# Patient Record
Sex: FEMALE | Race: WHITE | NOT HISPANIC OR LATINO | Employment: OTHER | URBAN - METROPOLITAN AREA
[De-identification: names, ages, dates, MRNs, and addresses within clinical notes are randomized per-mention and may not be internally consistent; named-entity substitution may affect disease eponyms.]

---

## 2023-05-08 ENCOUNTER — HOSPITAL ENCOUNTER (OUTPATIENT)
Facility: CLINIC | Age: 78
Setting detail: OBSERVATION
Discharge: HOME | End: 2023-05-09
Attending: EMERGENCY MEDICINE | Admitting: INTERNAL MEDICINE
Payer: COMMERCIAL

## 2023-05-08 DIAGNOSIS — U07.1 INFECTION DUE TO 2019 NOVEL CORONAVIRUS: ICD-10-CM

## 2023-05-08 DIAGNOSIS — K92.1 MELENA: ICD-10-CM

## 2023-05-08 DIAGNOSIS — K92.0 HEMATEMESIS WITH NAUSEA: ICD-10-CM

## 2023-05-08 LAB
ABO/RH(D): NORMAL
ALBUMIN SERPL BCG-MCNC: 3.8 G/DL (ref 3.5–5.2)
ALBUMIN UR-MCNC: 20 MG/DL
ALP SERPL-CCNC: 76 U/L (ref 35–104)
ALT SERPL W P-5'-P-CCNC: 13 U/L (ref 10–35)
ANION GAP SERPL CALCULATED.3IONS-SCNC: 9 MMOL/L (ref 7–15)
ANTIBODY SCREEN: NEGATIVE
APPEARANCE UR: CLEAR
APTT PPP: 31 SECONDS (ref 22–38)
AST SERPL W P-5'-P-CCNC: 25 U/L (ref 10–35)
BASOPHILS # BLD AUTO: 0 10E3/UL (ref 0–0.2)
BASOPHILS NFR BLD AUTO: 0 %
BILIRUB SERPL-MCNC: 0.2 MG/DL
BILIRUB UR QL STRIP: NEGATIVE
BUN SERPL-MCNC: 28.2 MG/DL (ref 8–23)
CALCIUM SERPL-MCNC: 9.2 MG/DL (ref 8.8–10.2)
CHLORIDE SERPL-SCNC: 102 MMOL/L (ref 98–107)
COLOR UR AUTO: YELLOW
CREAT SERPL-MCNC: 0.8 MG/DL (ref 0.51–0.95)
DEPRECATED HCO3 PLAS-SCNC: 27 MMOL/L (ref 22–29)
EOSINOPHIL # BLD AUTO: 0.3 10E3/UL (ref 0–0.7)
EOSINOPHIL NFR BLD AUTO: 3 %
ERYTHROCYTE [DISTWIDTH] IN BLOOD BY AUTOMATED COUNT: 12.5 % (ref 10–15)
FLUAV RNA SPEC QL NAA+PROBE: NEGATIVE
FLUBV RNA RESP QL NAA+PROBE: NEGATIVE
GFR SERPL CREATININE-BSD FRML MDRD: 75 ML/MIN/1.73M2
GLUCOSE SERPL-MCNC: 143 MG/DL (ref 70–99)
GLUCOSE UR STRIP-MCNC: NEGATIVE MG/DL
HCT VFR BLD AUTO: 37.1 % (ref 35–47)
HGB BLD-MCNC: 10.6 G/DL (ref 11.7–15.7)
HGB BLD-MCNC: 10.7 G/DL (ref 11.7–15.7)
HGB BLD-MCNC: 10.9 G/DL (ref 11.7–15.7)
HGB BLD-MCNC: 12.2 G/DL (ref 11.7–15.7)
HGB UR QL STRIP: NEGATIVE
HOLD SPECIMEN: NORMAL
IMM GRANULOCYTES # BLD: 0.1 10E3/UL
IMM GRANULOCYTES NFR BLD: 1 %
INR PPP: 1 (ref 0.85–1.15)
KETONES UR STRIP-MCNC: ABNORMAL MG/DL
LEUKOCYTE ESTERASE UR QL STRIP: NEGATIVE
LIPASE SERPL-CCNC: 30 U/L (ref 13–60)
LYMPHOCYTES # BLD AUTO: 0.5 10E3/UL (ref 0.8–5.3)
LYMPHOCYTES NFR BLD AUTO: 5 %
MCH RBC QN AUTO: 33.2 PG (ref 26.5–33)
MCHC RBC AUTO-ENTMCNC: 32.9 G/DL (ref 31.5–36.5)
MCV RBC AUTO: 101 FL (ref 78–100)
MONOCYTES # BLD AUTO: 0.7 10E3/UL (ref 0–1.3)
MONOCYTES NFR BLD AUTO: 7 %
MUCOUS THREADS #/AREA URNS LPF: PRESENT /LPF
NEUTROPHILS # BLD AUTO: 8.1 10E3/UL (ref 1.6–8.3)
NEUTROPHILS NFR BLD AUTO: 84 %
NITRATE UR QL: NEGATIVE
NRBC # BLD AUTO: 0 10E3/UL
NRBC BLD AUTO-RTO: 0 /100
PH UR STRIP: 6 [PH] (ref 5–7)
PLATELET # BLD AUTO: 183 10E3/UL (ref 150–450)
POTASSIUM SERPL-SCNC: 3.8 MMOL/L (ref 3.4–5.3)
PROT SERPL-MCNC: 5.8 G/DL (ref 6.4–8.3)
RBC # BLD AUTO: 3.68 10E6/UL (ref 3.8–5.2)
RBC URINE: <1 /HPF
RSV RNA SPEC NAA+PROBE: NEGATIVE
SARS-COV-2 RNA RESP QL NAA+PROBE: POSITIVE
SODIUM SERPL-SCNC: 138 MMOL/L (ref 136–145)
SP GR UR STRIP: 1.02 (ref 1–1.03)
SPECIMEN EXPIRATION DATE: NORMAL
SQUAMOUS EPITHELIAL: 3 /HPF
UROBILINOGEN UR STRIP-MCNC: NORMAL MG/DL
WBC # BLD AUTO: 9.6 10E3/UL (ref 4–11)
WBC URINE: 1 /HPF

## 2023-05-08 PROCEDURE — 250N000011 HC RX IP 250 OP 636: Performed by: INTERNAL MEDICINE

## 2023-05-08 PROCEDURE — G0378 HOSPITAL OBSERVATION PER HR: HCPCS

## 2023-05-08 PROCEDURE — 85018 HEMOGLOBIN: CPT | Performed by: HOSPITALIST

## 2023-05-08 PROCEDURE — 81001 URINALYSIS AUTO W/SCOPE: CPT | Performed by: HOSPITALIST

## 2023-05-08 PROCEDURE — 250N000009 HC RX 250: Performed by: INTERNAL MEDICINE

## 2023-05-08 PROCEDURE — C9113 INJ PANTOPRAZOLE SODIUM, VIA: HCPCS | Performed by: HOSPITALIST

## 2023-05-08 PROCEDURE — C9113 INJ PANTOPRAZOLE SODIUM, VIA: HCPCS | Performed by: INTERNAL MEDICINE

## 2023-05-08 PROCEDURE — 96365 THER/PROPH/DIAG IV INF INIT: CPT

## 2023-05-08 PROCEDURE — 250N000013 HC RX MED GY IP 250 OP 250 PS 637: Performed by: PHYSICIAN ASSISTANT

## 2023-05-08 PROCEDURE — 80053 COMPREHEN METABOLIC PANEL: CPT | Performed by: EMERGENCY MEDICINE

## 2023-05-08 PROCEDURE — 99223 1ST HOSP IP/OBS HIGH 75: CPT | Mod: AI | Performed by: HOSPITALIST

## 2023-05-08 PROCEDURE — 85730 THROMBOPLASTIN TIME PARTIAL: CPT | Performed by: EMERGENCY MEDICINE

## 2023-05-08 PROCEDURE — 36415 COLL VENOUS BLD VENIPUNCTURE: CPT | Performed by: INTERNAL MEDICINE

## 2023-05-08 PROCEDURE — 258N000003 HC RX IP 258 OP 636: Performed by: HOSPITALIST

## 2023-05-08 PROCEDURE — 99285 EMERGENCY DEPT VISIT HI MDM: CPT | Mod: 25,CS

## 2023-05-08 PROCEDURE — 250N000013 HC RX MED GY IP 250 OP 250 PS 637: Performed by: INTERNAL MEDICINE

## 2023-05-08 PROCEDURE — 999N000099 HC STATISTIC MODERATE SEDATION < 10 MIN: Performed by: INTERNAL MEDICINE

## 2023-05-08 PROCEDURE — 85025 COMPLETE CBC W/AUTO DIFF WBC: CPT | Performed by: EMERGENCY MEDICINE

## 2023-05-08 PROCEDURE — 83690 ASSAY OF LIPASE: CPT | Performed by: EMERGENCY MEDICINE

## 2023-05-08 PROCEDURE — 96366 THER/PROPH/DIAG IV INF ADDON: CPT

## 2023-05-08 PROCEDURE — 87637 SARSCOV2&INF A&B&RSV AMP PRB: CPT | Performed by: EMERGENCY MEDICINE

## 2023-05-08 PROCEDURE — 250N000011 HC RX IP 250 OP 636: Performed by: EMERGENCY MEDICINE

## 2023-05-08 PROCEDURE — 36415 COLL VENOUS BLD VENIPUNCTURE: CPT | Performed by: EMERGENCY MEDICINE

## 2023-05-08 PROCEDURE — 96375 TX/PRO/DX INJ NEW DRUG ADDON: CPT

## 2023-05-08 PROCEDURE — 88305 TISSUE EXAM BY PATHOLOGIST: CPT | Mod: TC | Performed by: INTERNAL MEDICINE

## 2023-05-08 PROCEDURE — 36415 COLL VENOUS BLD VENIPUNCTURE: CPT | Performed by: HOSPITALIST

## 2023-05-08 PROCEDURE — 258N000003 HC RX IP 258 OP 636: Performed by: EMERGENCY MEDICINE

## 2023-05-08 PROCEDURE — 250N000011 HC RX IP 250 OP 636: Performed by: HOSPITALIST

## 2023-05-08 PROCEDURE — 86901 BLOOD TYPING SEROLOGIC RH(D): CPT | Performed by: EMERGENCY MEDICINE

## 2023-05-08 PROCEDURE — 85018 HEMOGLOBIN: CPT | Performed by: INTERNAL MEDICINE

## 2023-05-08 PROCEDURE — C9803 HOPD COVID-19 SPEC COLLECT: HCPCS

## 2023-05-08 PROCEDURE — 43239 EGD BIOPSY SINGLE/MULTIPLE: CPT | Performed by: INTERNAL MEDICINE

## 2023-05-08 PROCEDURE — C9113 INJ PANTOPRAZOLE SODIUM, VIA: HCPCS | Performed by: EMERGENCY MEDICINE

## 2023-05-08 PROCEDURE — 96376 TX/PRO/DX INJ SAME DRUG ADON: CPT | Mod: XU

## 2023-05-08 PROCEDURE — 88305 TISSUE EXAM BY PATHOLOGIST: CPT | Mod: 26 | Performed by: PATHOLOGY

## 2023-05-08 PROCEDURE — 85610 PROTHROMBIN TIME: CPT | Performed by: EMERGENCY MEDICINE

## 2023-05-08 PROCEDURE — 88342 IMHCHEM/IMCYTCHM 1ST ANTB: CPT | Mod: 26 | Performed by: PATHOLOGY

## 2023-05-08 RX ORDER — TRAZODONE HYDROCHLORIDE 50 MG/1
50 TABLET, FILM COATED ORAL AT BEDTIME
COMMUNITY

## 2023-05-08 RX ORDER — SUCRALFATE ORAL 1 G/10ML
1 SUSPENSION ORAL
Status: DISCONTINUED | OUTPATIENT
Start: 2023-05-08 | End: 2023-05-09 | Stop reason: HOSPADM

## 2023-05-08 RX ORDER — NALOXONE HYDROCHLORIDE 0.4 MG/ML
0.4 INJECTION, SOLUTION INTRAMUSCULAR; INTRAVENOUS; SUBCUTANEOUS
Status: DISCONTINUED | OUTPATIENT
Start: 2023-05-08 | End: 2023-05-08 | Stop reason: HOSPADM

## 2023-05-08 RX ORDER — DIPHENHYDRAMINE HYDROCHLORIDE 50 MG/ML
25-50 INJECTION INTRAMUSCULAR; INTRAVENOUS
Status: DISCONTINUED | OUTPATIENT
Start: 2023-05-08 | End: 2023-05-08 | Stop reason: HOSPADM

## 2023-05-08 RX ORDER — FENTANYL CITRATE 50 UG/ML
50-100 INJECTION, SOLUTION INTRAMUSCULAR; INTRAVENOUS EVERY 5 MIN PRN
Status: DISCONTINUED | OUTPATIENT
Start: 2023-05-08 | End: 2023-05-08 | Stop reason: HOSPADM

## 2023-05-08 RX ORDER — SIMETHICONE 40MG/0.6ML
133 SUSPENSION, DROPS(FINAL DOSAGE FORM)(ML) ORAL
Status: DISCONTINUED | OUTPATIENT
Start: 2023-05-08 | End: 2023-05-08 | Stop reason: HOSPADM

## 2023-05-08 RX ORDER — ATROPINE SULFATE 0.1 MG/ML
1 INJECTION INTRAVENOUS
Status: DISCONTINUED | OUTPATIENT
Start: 2023-05-08 | End: 2023-05-08 | Stop reason: HOSPADM

## 2023-05-08 RX ORDER — AMOXICILLIN 250 MG
1 CAPSULE ORAL 2 TIMES DAILY PRN
Status: DISCONTINUED | OUTPATIENT
Start: 2023-05-08 | End: 2023-05-09 | Stop reason: HOSPADM

## 2023-05-08 RX ORDER — NALOXONE HYDROCHLORIDE 0.4 MG/ML
0.2 INJECTION, SOLUTION INTRAMUSCULAR; INTRAVENOUS; SUBCUTANEOUS
Status: DISCONTINUED | OUTPATIENT
Start: 2023-05-08 | End: 2023-05-08 | Stop reason: HOSPADM

## 2023-05-08 RX ORDER — LISINOPRIL 10 MG/1
10 TABLET ORAL DAILY
COMMUNITY

## 2023-05-08 RX ORDER — SERTRALINE HYDROCHLORIDE 100 MG/1
150 TABLET, FILM COATED ORAL DAILY
COMMUNITY

## 2023-05-08 RX ORDER — FEXOFENADINE HCL 180 MG/1
180 TABLET ORAL DAILY
COMMUNITY

## 2023-05-08 RX ORDER — POLYETHYLENE GLYCOL 3350 17 G/17G
17 POWDER, FOR SOLUTION ORAL DAILY PRN
Status: DISCONTINUED | OUTPATIENT
Start: 2023-05-08 | End: 2023-05-09 | Stop reason: HOSPADM

## 2023-05-08 RX ORDER — LIDOCAINE 40 MG/G
CREAM TOPICAL
Status: DISCONTINUED | OUTPATIENT
Start: 2023-05-08 | End: 2023-05-09 | Stop reason: HOSPADM

## 2023-05-08 RX ORDER — SODIUM CHLORIDE, SODIUM LACTATE, POTASSIUM CHLORIDE, CALCIUM CHLORIDE 600; 310; 30; 20 MG/100ML; MG/100ML; MG/100ML; MG/100ML
INJECTION, SOLUTION INTRAVENOUS CONTINUOUS
Status: DISCONTINUED | OUTPATIENT
Start: 2023-05-08 | End: 2023-05-08

## 2023-05-08 RX ORDER — BISACODYL 10 MG
10 SUPPOSITORY, RECTAL RECTAL DAILY PRN
Status: DISCONTINUED | OUTPATIENT
Start: 2023-05-08 | End: 2023-05-09 | Stop reason: HOSPADM

## 2023-05-08 RX ORDER — TRAZODONE HYDROCHLORIDE 50 MG/1
50 TABLET, FILM COATED ORAL AT BEDTIME
Status: DISCONTINUED | OUTPATIENT
Start: 2023-05-08 | End: 2023-05-09 | Stop reason: HOSPADM

## 2023-05-08 RX ORDER — FLUMAZENIL 0.1 MG/ML
0.2 INJECTION, SOLUTION INTRAVENOUS
Status: DISCONTINUED | OUTPATIENT
Start: 2023-05-08 | End: 2023-05-08 | Stop reason: HOSPADM

## 2023-05-08 RX ORDER — AMOXICILLIN 250 MG
2 CAPSULE ORAL 2 TIMES DAILY PRN
Status: DISCONTINUED | OUTPATIENT
Start: 2023-05-08 | End: 2023-05-09 | Stop reason: HOSPADM

## 2023-05-08 RX ORDER — FLUMAZENIL 0.1 MG/ML
0.2 INJECTION, SOLUTION INTRAVENOUS
Status: ACTIVE | OUTPATIENT
Start: 2023-05-08 | End: 2023-05-09

## 2023-05-08 RX ORDER — ONDANSETRON 4 MG/1
4 TABLET, ORALLY DISINTEGRATING ORAL EVERY 6 HOURS PRN
Status: DISCONTINUED | OUTPATIENT
Start: 2023-05-08 | End: 2023-05-09 | Stop reason: HOSPADM

## 2023-05-08 RX ORDER — EPINEPHRINE 1 MG/ML
0.1 INJECTION, SOLUTION, CONCENTRATE INTRAVENOUS
Status: DISCONTINUED | OUTPATIENT
Start: 2023-05-08 | End: 2023-05-08 | Stop reason: HOSPADM

## 2023-05-08 RX ORDER — ACETAMINOPHEN 325 MG/1
650 TABLET ORAL EVERY 6 HOURS PRN
Status: DISCONTINUED | OUTPATIENT
Start: 2023-05-08 | End: 2023-05-09 | Stop reason: HOSPADM

## 2023-05-08 RX ORDER — ONDANSETRON 2 MG/ML
4 INJECTION INTRAMUSCULAR; INTRAVENOUS EVERY 30 MIN PRN
Status: DISCONTINUED | OUTPATIENT
Start: 2023-05-08 | End: 2023-05-08

## 2023-05-08 RX ORDER — ACETAMINOPHEN 650 MG/1
650 SUPPOSITORY RECTAL EVERY 6 HOURS PRN
Status: DISCONTINUED | OUTPATIENT
Start: 2023-05-08 | End: 2023-05-09 | Stop reason: HOSPADM

## 2023-05-08 RX ORDER — ONDANSETRON 2 MG/ML
4 INJECTION INTRAMUSCULAR; INTRAVENOUS EVERY 6 HOURS PRN
Status: DISCONTINUED | OUTPATIENT
Start: 2023-05-08 | End: 2023-05-09 | Stop reason: HOSPADM

## 2023-05-08 RX ADMIN — SODIUM CHLORIDE, POTASSIUM CHLORIDE, SODIUM LACTATE AND CALCIUM CHLORIDE: 600; 310; 30; 20 INJECTION, SOLUTION INTRAVENOUS at 06:35

## 2023-05-08 RX ADMIN — TOPICAL ANESTHETIC 0.5 ML: 200 SPRAY DENTAL; PERIODONTAL at 13:58

## 2023-05-08 RX ADMIN — TRAZODONE HYDROCHLORIDE 50 MG: 50 TABLET ORAL at 21:23

## 2023-05-08 RX ADMIN — ONDANSETRON 4 MG: 2 INJECTION INTRAMUSCULAR; INTRAVENOUS at 04:45

## 2023-05-08 RX ADMIN — PANTOPRAZOLE SODIUM 8 MG/HR: 40 INJECTION, POWDER, LYOPHILIZED, FOR SOLUTION INTRAVENOUS at 04:45

## 2023-05-08 RX ADMIN — ACETAMINOPHEN 650 MG: 325 TABLET ORAL at 20:09

## 2023-05-08 RX ADMIN — SUCRALFATE ORAL 1 G: 1 SUSPENSION ORAL at 19:13

## 2023-05-08 RX ADMIN — MIDAZOLAM 1 MG: 1 INJECTION INTRAMUSCULAR; INTRAVENOUS at 13:57

## 2023-05-08 RX ADMIN — PANTOPRAZOLE SODIUM 8 MG/HR: 40 INJECTION, POWDER, LYOPHILIZED, FOR SOLUTION INTRAVENOUS at 16:04

## 2023-05-08 RX ADMIN — SUCRALFATE ORAL 1 G: 1 SUSPENSION ORAL at 21:23

## 2023-05-08 RX ADMIN — FENTANYL CITRATE 100 MCG: 0.05 INJECTION, SOLUTION INTRAMUSCULAR; INTRAVENOUS at 13:57

## 2023-05-08 RX ADMIN — SODIUM CHLORIDE, POTASSIUM CHLORIDE, SODIUM LACTATE AND CALCIUM CHLORIDE: 600; 310; 30; 20 INJECTION, SOLUTION INTRAVENOUS at 16:03

## 2023-05-08 RX ADMIN — SERTRALINE HYDROCHLORIDE 150 MG: 100 TABLET ORAL at 18:42

## 2023-05-08 RX ADMIN — PANTOPRAZOLE SODIUM 40 MG: 40 INJECTION, POWDER, LYOPHILIZED, FOR SOLUTION INTRAVENOUS at 04:47

## 2023-05-08 RX ADMIN — PANTOPRAZOLE SODIUM 40 MG: 40 INJECTION, POWDER, FOR SOLUTION INTRAVENOUS at 20:00

## 2023-05-08 ASSESSMENT — ENCOUNTER SYMPTOMS
DIZZINESS: 0
LIGHT-HEADEDNESS: 0
BLOOD IN STOOL: 1
ABDOMINAL PAIN: 0
VOMITING: 1
NAUSEA: 1

## 2023-05-08 ASSESSMENT — ACTIVITIES OF DAILY LIVING (ADL)
ADLS_ACUITY_SCORE: 35

## 2023-05-08 NOTE — ED NOTES
"[facility]  ED Nurse Handoff Report    Ines Harry is a 77 year old female   ED Chief complaint: Hematemesis and GI Problem  . ED Diagnosis:   Final diagnoses:   None     Allergies: No Known Allergies    Code Status:   Activity level - Baseline/Home:  Independent. Activity Level - Current:   Stand by Assist. Lift room needed: No. Bariatric: No   Needed: No   Isolation: Yes. Infection: COVID r/o and special precautions.     Vital Signs:   Vitals:    05/08/23 0500 05/08/23 0515 05/08/23 0530 05/08/23 0531   BP: 93/57 98/63 91/52    Pulse: 88 84 80    Resp:       Temp:       TempSrc:       SpO2: 99% 95% 96% 96%       Cardiac Rhythm:  ,      Pain level:    Patient confused: No. Patient Falls Risk: Yes.   Elimination Status: Has voided   Patient Report - Initial Complaint: Flu-like symptoms, hematemesis. Focused Assessment: Arrived via EMS from private home.  Pt reports \"a couple\" of episodes of bloody stool and emesis.  Per EMS, emesis \"dark\" in color.  Pt reports dizziness and weakness. Hx of bleeding ulcers.    Tests Performed: labs. Abnormal Results: covid+.   Treatments provided: IV zofran, protonix gtt and bolus  Family Comments: none at bedside  OBS brochure/video discussed/provided to patient:  N/A  ED Medications:   Medications   ondansetron (ZOFRAN) injection 4 mg (4 mg Intravenous $Given 5/8/23 9419)   pantoprazole (PROTONIX) 80 mg in sodium chloride 0.9 % 100 mL infusion (8 mg/hr Intravenous $New Bag 5/8/23 0445)   pantoprazole (PROTONIX) IV push injection 40 mg (40 mg Intravenous $Given 5/8/23 1607)     Drips infusing:  Yes  For the majority of the shift, the patient's behavior Green. Interventions performed were .    Sepsis treatment initiated: No     Patient tested for COVID 19 prior to admission: YES    ED Nurse Name/Phone Number: ABDIEL Christianson,   5:33 AM    RECEIVING UNIT ED HANDOFF REVIEW    Above ED Nurse Handoff Report was reviewed: Yes  Reviewed by: Dorys Gurrola RN on May " 8, 2023 at 4:11 PM

## 2023-05-08 NOTE — H&P
[facility]  Hospitalist H&P    Name: Ines Harry      MRN: [id number]  YOB: 1945    Age: 77 year old  Date of admission: 5/8/2023  Primary care provider: Zen Early            Assessment and Plan:     Ines Harry is a 77 year old female with a history of hypertension, GERD, mild aortic stenosis, COPD, parotid gland cancer, depression, and prior duodenal ulcer who presents with hematemesis and melena.    1. Hematemesis and melena: The description of her events raises suspicion for an upper GI bleed, this might also be supported by an elevated BUN.  She has had some intermittent episodes of nonbloody vomiting over the weekend so this could be consistent with a Tori-Torres tear.  She tells me she also has a history of duodenal ulcers several years ago so this could be peptic ulcer disease as well.  Overall doubt variceal disease or malignancy is the explanation.  She denies any use of antiplatelet medication or anticoagulants.  She admits to 1 alcoholic beverage a day which could be contributing somewhat.  She admits to rare but occasional NSAID use.  For now we will continue n.p.o. status and IV fluids.  We will continue the Protonix infusion that was started in the emergency department.  Hemoglobin fortunately is stable but we will continue every 6 hour hemoglobin levels to ensure stability.  I like to request GI consultation as she see if she would benefit from an EGD.  2. Hypertension: Blood pressure somewhat soft but stable.  We will hold her prior to admission lisinopril.  3. Depression: Continue sertraline.  4. Asthma and COPD: Lungs are clear on examination.  No evidence of exacerbation.  5. Mild aortic stenosis and aortic regurgitation: Volume status appears euvolemic.    Code status: Full.  Admit to observation status.  Prophylaxis: PCD's.  Disposition: Home 1 to 2 days.    80 MINUTES SPENT BY ME on the date of service doing chart review,  "history, exam, documentation & further activities per the note.          Chief Complaint:     GI bleeding.         History of Present Illness:   Ines Harry is a 77 year old female who presents with GI bleeding.  History was obtained from my discussion with the patient at the bedside.  I also discussed the case with the ED provider.  The electronic medical record was also reviewed.    The patient reported some intermittent upset stomach over the past 2 to 3 days.  She had a couple episodes of mild clear vomiting earlier in the weekend.  She admits to some mild nausea but no diarrhea.  She denies abdominal pain.  She describes these as \"flulike symptoms\".  Overnight the patient developed nausea and had 1 episode of hematemesis (described as an initial episode of vomiting of bright red blood).  Subsequently she had a dark bowel movement that she describes as black.  She does take daily Prilosec.  She admits to having 1 glass of wine a day and occasional but rare ibuprofen use.  She does not take aspirin or any anticoagulants.  She became concerned because she has a history of a duodenal ulcer so came to the hospital for evaluation.    Here in the ED her temperature is 97.7, heart rate 83, blood pressure 92/59, respirate 16 and oxygen saturation 98% on room air.  Labs show normal basic metabolic panel except for a BUN of 28.  Liver function tests are normal.  Blood sugar is 143 and lipase is 30.  CBC normal.  She tested positive for COVID-19.  She is being admitted for further management.            Past Medical History:   Hypertension  Asthma  COPD  Mild aortic stenosis  Parotid gland cancer  GERD  Depression  Duodenal ulcer          Past Surgical History:   No past surgical history on file.          Social History:     Social History     Tobacco Use     Smoking status: Not on file     Smokeless tobacco: Not on file   Substance Use Topics     Alcohol use: Not on file             Family History:   The family " history was fully reviewed and non-contributory in this case.         Allergies:   No Known Allergies          Medications:     Prior to Admission medications    Not on File             Review of Systems:     A Comprehensive greater than 10 system review of systems was carried out.  Pertinent positives and negatives are noted above.  Otherwise negative for contributory information.           Physical Exam:   Blood pressure 91/52, pulse 80, temperature 97.7  F (36.5  C), temperature source Oral, resp. rate 16, SpO2 96 %.  Wt Readings from Last 1 Encounters:   No data found for Wt     Exam:  GENERAL: No apparent distress. Awake, alert, and fully oriented.  HEENT: Normocephalic, atraumatic. Extraocular movements intact.  CARDIOVASCULAR: Regular rate and rhythm without murmurs or rubs. No S3.  PULMONARY: Clear to auscultation bilaterally.  ABDOMINAL: Soft, non-tender, non-distended. Bowel sounds normoactive.   EXTREMITIES: No cyanosis or clubbing. No appreciable edema.  NEUROLOGICAL: CN 2-12 grossly intact, no focal neurological deficits.  DERMATOLOGICAL: No rash, ulcer, bruising, nor jaundice.          Data:   EKG:  Personally reviewed.     Laboratory:  Recent Labs   Lab 05/08/23 0420   WBC 9.6   HGB 12.2   HCT 37.1   *        Recent Labs   Lab 05/08/23  0420      POTASSIUM 3.8   CHLORIDE 102   CO2 27   ANIONGAP 9   *   BUN 28.2*   CR 0.80   GFRESTIMATED 75   KWESI 9.2     Recent Labs   Lab 05/08/23  0420   AST 25   ALT 13   ALKPHOS 76   BILITOTAL 0.2     Recent Labs   Lab 05/08/23  0420   LIPASE 30     No results for input(s): CULT in the last 168 hours.    Imaging:  No results found for this or any previous visit (from the past 24 hour(s)).    James Messina DO MPH  [facility] Hospitalist  [address] [place] [address]  05/08/2023

## 2023-05-08 NOTE — PRE-PROCEDURE
Pre-Endoscopy History and Physical     Ines Harry MRN# [id number]   YOB: 1945 Age: 77 year old     Date of Procedure: 5/8/2023  Primary care provider: Zen Early  Type of Endoscopy: esophagogastroduodenoscopy (upper GI endoscopy)  Reason for Procedure: UGI bleed  Type of Anesthesia Anticipated: Moderate (conscious) sedation    HPI:    Ines is a 77 year old female who will be undergoing the above procedure.      A history and physical has been performed. The patient's medications and allergies have been reviewed. The risks and benefits of the procedure and the sedation options and risks were discussed with the patient.  All questions were answered and informed consent was obtained.      No Known Allergies     Current Facility-Administered Medications   Medication     0.9% sodium chloride BOLUS     acetaminophen (TYLENOL) tablet 650 mg    Or     acetaminophen (TYLENOL) Suppository 650 mg     atropine injection 1 mg     benzocaine 20% (HURRICAINE/TOPEX) 20 % spray 0.5 mL     bisacodyl (DULCOLAX) suppository 10 mg     diphenhydrAMINE (BENADRYL) injection 25-50 mg     EPINEPHrine PF (ADRENALIN) injection 0.1 mg     fentaNYL (PF) (SUBLIMAZE) injection  mcg     flumazenil (ROMAZICON) injection 0.2 mg     glucagon injection 0.5 mg     lactated ringers infusion     lidocaine (LMX4) cream     lidocaine 1 % 0.1-1 mL     melatonin tablet 1 mg     midazolam (VERSED) injection 0.5-2 mg     naloxone (NARCAN) injection 0.2 mg    Or     naloxone (NARCAN) injection 0.4 mg    Or     naloxone (NARCAN) injection 0.2 mg    Or     naloxone (NARCAN) injection 0.4 mg     ondansetron (ZOFRAN ODT) ODT tab 4 mg    Or     ondansetron (ZOFRAN) injection 4 mg     pantoprazole (PROTONIX) 80 mg in sodium chloride 0.9 % 100 mL infusion     polyethylene glycol (MIRALAX) Packet 17 g     senna-docusate (SENOKOT-S/PERICOLACE) 8.6-50 MG per tablet 1 tablet    Or     senna-docusate (SENOKOT-S/PERICOLACE)  8.6-50 MG per tablet 2 tablet     simethicone (MYLICON) suspension 133 mg     sodium chloride (PF) 0.9% PF flush 3 mL     sodium chloride (PF) 0.9% PF flush 3 mL     sodium chloride (PF) 0.9% PF flush 3 mL       Patient Active Problem List   Diagnosis     Melena     Hematemesis with nausea        No past medical history on file.     No past surgical history on file.    Social History     Tobacco Use     Smoking status: Not on file     Smokeless tobacco: Not on file   Substance Use Topics     Alcohol use: Not on file       No family history on file.         Medications:     Medications Prior to Admission   Medication Sig Dispense Refill Last Dose     fexofenadine (ALLEGRA) 180 MG tablet Take 180 mg by mouth daily   5/7/2023 at am     lisinopril (ZESTRIL) 10 MG tablet Take 10 mg by mouth daily   5/7/2023 at am     omeprazole (PRILOSEC) 20 MG DR capsule Take 20 mg by mouth daily   5/7/2023 at am     sertraline (ZOLOFT) 100 MG tablet Take 150 mg by mouth daily   5/7/2023 at am     traZODone (DESYREL) 50 MG tablet Take 50 mg by mouth At Bedtime   5/6/2023 at hs       Scheduled Medications:    sodium chloride (PF)  3 mL Intracatheter Q8H       PRN:  sodium chloride 0.9%, acetaminophen **OR** acetaminophen, atropine, benzocaine 20%, bisacodyl, diphenhydrAMINE, EPINEPHrine, fentaNYL, flumazenil, glucagon, lidocaine 4%, lidocaine (buffered or not buffered), melatonin, midazolam, naloxone **OR** naloxone **OR** naloxone **OR** naloxone, ondansetron **OR** ondansetron, polyethylene glycol, senna-docusate **OR** senna-docusate, simethicone, sodium chloride (PF), sodium chloride (PF)    PHYSICAL EXAM:   /69   Pulse 85   Temp 98.5  F (36.9  C) (Oral)   Resp 18   SpO2 96%  There is no height or weight on file to calculate BMI.   RESP: lungs clear to auscultation - no rales, rhonchi or wheezes  CV: systolic murmur    IMPRESSION   ASA Class 2 - Mild systemic disease      Signed Electronically by: Emanuel Orourke,  MD  May 8, 2023    .

## 2023-05-08 NOTE — ED PROVIDER NOTES
History     Chief Complaint:  Hematemesis and GI Problem    The history is provided by the patient.      Ines Harry is a 77 year old female with a history of duodenal ulcer, hypertension, and parotid gland cancer who presents with hematemesis and other GI problem. The patient reports that two days ago she felt like she was coming down with the flu describing feelings of nausea. She comes to the ED today, however, because she had a couple episodes of hematemesis and melena which EMS describe as dark in color. Patient says this has happened before, and was attributed to an ulcer at that time. Patient denies any dizziness, light-headedness, chest pain, or abdominal pain.     Independent Historian:   None - Patient Only    ROS:  Review of Systems   Cardiovascular: Negative for chest pain.   Gastrointestinal: Positive for blood in stool, nausea and vomiting (dark blood). Negative for abdominal pain.   Neurological: Negative for dizziness and light-headedness.   All other systems reviewed and are negative.    Allergies:  Cats     Medications:    Zoloft   Zestril   Desyrel   Prilosec     Past Medical History:    Hypertension  Asthma  COPD bronchitis  Kidney stones   Mild aortic stenosis  Mild aortic regurgitation  Parotid gland cancer   GERD  Depression  Obesity   Spinal stenosis  Carpal tunnel syndrome   Duodenal ulcer    Social History:     Presents to the ED alone via EMS.     Physical Exam     Patient Vitals for the past 24 hrs:   BP Temp Temp src Pulse Resp SpO2   05/08/23 0531 -- -- -- -- -- 96 %   05/08/23 0530 91/52 -- -- 80 -- 96 %   05/08/23 0515 98/63 -- -- 84 -- 95 %   05/08/23 0500 93/57 -- -- 88 -- 99 %   05/08/23 0454 92/59 -- -- 83 -- 98 %   05/08/23 0415 95/54 -- -- 88 -- 97 %   05/08/23 0409 102/54 97.7  F (36.5  C) Oral 91 16 97 %   05/08/23 0407 102/53 -- -- 65 18 98 %        Physical Exam  General: Patient is awake, alert  Head: The scalp, face, and head appear normal  Eyes: The pupils are  equal, round, and reactive to light. Conjunctivae and sclerae are normal  ENT: External acoustic canals are normal. The oropharynx is normal without erythema. Uvula is in the midline  Neck: Normal range of motion.   CV: Regular rate and rhythm.   Resp: Lungs are clear without wheezes or rales. No respiratory distress.   GI: Abdomen is soft, no rigidity, guarding, or rebound. No distension. No tenderness to palpation in any quadrant.     MS: Normal tone. Joints grossly normal without effusions. No asymmetric leg swelling, calf or thigh tenderness.    Skin: No rash or lesions noted. Normal capillary refill noted  Neuro: Speech is normal and fluent. Face is symmetric. Moving all extremities.   Psych:  Normal affect.  Appropriate interactions.      Emergency Department Course       Laboratory:  Labs Ordered and Resulted from Time of ED Arrival to Time of ED Departure   INFLUENZA A/B, RSV, & SARS-COV2 PCR - Abnormal       Result Value    Influenza A PCR Negative      Influenza B PCR Negative      RSV PCR Negative      SARS CoV2 PCR Positive (*)    COMPREHENSIVE METABOLIC PANEL - Abnormal    Sodium 138      Potassium 3.8      Chloride 102      Carbon Dioxide (CO2) 27      Anion Gap 9      Urea Nitrogen 28.2 (*)     Creatinine 0.80      Calcium 9.2      Glucose 143 (*)     Alkaline Phosphatase 76      AST 25      ALT 13      Protein Total 5.8 (*)     Albumin 3.8      Bilirubin Total 0.2      GFR Estimate 75     CBC WITH PLATELETS AND DIFFERENTIAL - Abnormal    WBC Count 9.6      RBC Count 3.68 (*)     Hemoglobin 12.2      Hematocrit 37.1       (*)     MCH 33.2 (*)     MCHC 32.9      RDW 12.5      Platelet Count 183      % Neutrophils 84      % Lymphocytes 5      % Monocytes 7      % Eosinophils 3      % Basophils 0      % Immature Granulocytes 1      NRBCs per 100 WBC 0      Absolute Neutrophils 8.1      Absolute Lymphocytes 0.5 (*)     Absolute Monocytes 0.7      Absolute Eosinophils 0.3      Absolute Basophils 0.0       Absolute Immature Granulocytes 0.1      Absolute NRBCs 0.0     INR - Normal    INR 1.00     LIPASE - Normal    Lipase 30     PARTIAL THROMBOPLASTIN TIME - Normal    aPTT 31     ROUTINE UA WITH MICROSCOPIC REFLEX TO CULTURE   TYPE AND SCREEN, ADULT    ABO/RH(D) O POS      Antibody Screen Negative      SPECIMEN EXPIRATION DATE [id number]     ABO/RH TYPE AND SCREEN          Emergency Department Course & Assessments:    Interventions:  Medications   ondansetron (ZOFRAN) injection 4 mg (4 mg Intravenous $Given 5/8/23 0445)   pantoprazole (PROTONIX) 80 mg in sodium chloride 0.9 % 100 mL infusion (8 mg/hr Intravenous $New Bag 5/8/23 0445)   pantoprazole (PROTONIX) IV push injection 40 mg (40 mg Intravenous $Given 5/8/23 0447)        Assessments:  0430 I obtained history and examined the patient as noted above.     Consultations/Discussion of Management or Tests:  Spoke with Dr. James Messina regarding admission     Social Determinants of Health affecting care:   None    Disposition:  The patient was admitted to the hospital under the care of Dr. Messina.     Impression & Plan      Medical Decision Making:  Patient is a 77-year-old female who presents emergency department with hematemesis and melena.  Upon initial evaluation she is hemodynamically stable with normal vital signs.  She is afebrile.  She is acting well on room air.  She notes that she does have history of duodenal ulcer that led to some significant melena.  But this happened many years ago.  She is not currently on blood thinning medications.  Physical exam detailed above.  Blood work was obtained which shows a stable hemoglobin and mildly elevated BUN.  She was also found to be COVID-positive.  Patient also had COVID infection in February.  Unclear if this is a residual positive results or a new infection leading to her symptoms of fatigue, nausea and vomiting.  No significant respiratory distress or hypoxia at this time.  However given her hematemesis and  melena, I would admit her for serial hemoglobins and possible GI consultation for endoscopy.  I discussed my plan with the patient who is amenable at this time.  Patient was treated with Zofran, Protonix bolus and Protonix drip in the emergency department.    Diagnosis:    ICD-10-CM    1. Hematemesis with nausea  K92.0       2. Melena  K92.1             Scribe Disclosure:  I, Erich Lemos, am serving as a scribe at 4:23 AM on 5/8/2023 to document services personally performed by Tristan Nettles MD based on my observations and the provider's statements to me.   5/8/2023   Tristan Nettles MD Battista, Christopher Joseph, MD  05/08/23 0531

## 2023-05-08 NOTE — ED TRIAGE NOTES
"Arrived via EMS from private home.  Pt reports \"a couple\" of episodes of bloody stool and emesis.  Per EMS, emesis \"dark\" in color.  Pt reports dizziness and weakness. Hx of bleeding ulcers.      Triage Assessment     Row Name 05/08/23 0406       Triage Assessment (Adult)    Airway WDL WDL       Respiratory WDL    Respiratory WDL WDL       Skin Circulation/Temperature WDL    Skin Circulation/Temperature WDL WDL       Cardiac WDL    Cardiac WDL WDL       Peripheral/Neurovascular WDL    Peripheral Neurovascular WDL WDL       Cognitive/Neuro/Behavioral WDL    Cognitive/Neuro/Behavioral WDL WDL              " 15-year-old male presents with back pain that goes to his left testicle and left leg. Started after lifting a heavy object at work 2 weeks ago. He denies any fevers or chills. He denies any numbness or tingling. States that the pain is worse today. Denies any nausea or vomiting. Denies any dysuria or other medical complaints at this time.  is used for history. Back Pain     Groin Pain         No past medical history on file. No past surgical history on file. No family history on file. Social History     Socioeconomic History    Marital status: SINGLE     Spouse name: Not on file    Number of children: Not on file    Years of education: Not on file    Highest education level: Not on file   Occupational History    Not on file   Social Needs    Financial resource strain: Not on file    Food insecurity     Worry: Not on file     Inability: Not on file    Transportation needs     Medical: Not on file     Non-medical: Not on file   Tobacco Use    Smoking status: Never Smoker   Substance and Sexual Activity    Alcohol use: No     Alcohol/week: 0.0 standard drinks    Drug use: No    Sexual activity: Not on file   Lifestyle    Physical activity     Days per week: Not on file     Minutes per session: Not on file    Stress: Not on file   Relationships    Social connections     Talks on phone: Not on file     Gets together: Not on file     Attends Christianity service: Not on file     Active member of club or organization: Not on file     Attends meetings of clubs or organizations: Not on file     Relationship status: Not on file    Intimate partner violence     Fear of current or ex partner: Not on file     Emotionally abused: Not on file     Physically abused: Not on file     Forced sexual activity: Not on file   Other Topics Concern    Not on file   Social History Narrative    Not on file         ALLERGIES: Patient has no known allergies.     Review of Systems Musculoskeletal: Positive for back pain. All other systems reviewed and are negative. Vitals:    05/02/21 0759   BP: (!) 179/112   Pulse: 84   Resp: 18   SpO2: 99%   Weight: 91.4 kg (201 lb 8 oz)   Height: 5' 8\" (1.727 m)            Physical Exam  Vitals signs and nursing note reviewed. Constitutional:       Appearance: He is well-developed. HENT:      Head: Normocephalic and atraumatic. Eyes:      General: No scleral icterus. Neck:      Musculoskeletal: No neck rigidity. Trachea: No tracheal deviation. Cardiovascular:      Rate and Rhythm: Normal rate. Pulmonary:      Effort: Pulmonary effort is normal.   Abdominal:      General: There is no distension. Genitourinary:     Testes:         Right: Tenderness or swelling not present. Left: Tenderness present. Swelling not present. Musculoskeletal:         General: Tenderness (Left knee, lower back.) present. No deformity. Skin:     General: Skin is warm and dry. Neurological:      General: No focal deficit present. Mental Status: He is alert. Sensory: No sensory deficit. Motor: No weakness. Gait: Gait normal.   Psychiatric:         Mood and Affect: Mood normal.          [facility]  ED Course as of May 02 0948   Sun May 02, 2021   0940 Urinalysis shows no blood or white blood cells. Doubt kidney stone or kidney infection. Testicular ultrasound normal showing no epididymitis, orchitis or torsion. [TT]      ED Course User Index  [TT] Fariba Beckett MD       Procedures        9:48 AM  Patient re-evaluated. All questions answered. Patient appropriate for discharge. Given return precautions and follow up instructions.      LABORATORY TESTS:  Labs Reviewed   URINALYSIS W/MICROSCOPIC - Abnormal; Notable for the following components:       Result Value    Appearance CLOUDY (*)     All other components within normal limits   URINE CULTURE HOLD SAMPLE   CHLAMYDIA/GC PCR       IMAGING RESULTS:  US SCROTUM/TESTICLES   Final Result      No acute process. MEDICATIONS GIVEN:  Medications   ketorolac (TORADOL) injection 60 mg (60 mg IntraMUSCular Given 5/2/21 0919)       IMPRESSION:  1. Acute left-sided low back pain with left-sided sciatica        PLAN:  1. Current Discharge Medication List      START taking these medications    Details   methocarbamoL (Robaxin-750) 750 mg tablet Take 1 Tab by mouth four (4) times daily for 7 days. Qty: 28 Tab, Refills: 0         CONTINUE these medications which have CHANGED    Details   ibuprofen (MOTRIN) 600 mg tablet Take 1 Tab by mouth every six (6) hours as needed for Pain. Qty: 30 Tab, Refills: 0           2. Follow-up Information     Follow up With Specialties Details Why [address] [facility]  Schedule an appointment as soon as possible for a visit   [address] [place] [address]  [phone]    [facility] EMERGENCY DEPT Emergency Medicine  If symptoms worsen or new concerns [address] [place] [address]  [phone]        3. Return to ED for new or worsening symptoms       Frannie Calderon.  Tye Sheffield MD

## 2023-05-08 NOTE — PLAN OF CARE
PRIMARY DIAGNOSIS: GI BLEED    OUTPATIENT/OBSERVATION GOALS TO BE MET BEFORE DISCHARGE  Orthostatic performed: No    Stable Hgb Yes.   Recent Labs   Lab Test 05/08/23  0745 05/08/23  0420   HGB 10.9* 12.2       Resolved or declined bleeding episodes: Yes Last episode: 0300    Appropriate testing complete: No, possible EGD per chart review    Cleared for discharge by consultants (if involved): No, waiting for GI    Safe discharge environment identified: Yes    Discharge Planner Nurse   Safe discharge environment identified: Yes  Barriers to discharge: No       Entered by: Alexandru Perea RN 05/08/2023 8:26 AM     Please review provider order for any additional goals.   Nurse to notify provider when observation goals have been met and patient is ready for discharge.

## 2023-05-08 NOTE — PROGRESS NOTES
Patient seen, care assumed  Agree with H&P by Dr Messina earlier this am     Here with upper GIB with ABL and borderline BPs  EGD with multiple jejunal ulcers-non bleeding, looks related to NSAIDs    Incidentally noted to have COVID (not sure why it was tested) she is completely asx and fully vaccinated with 5 shot pfizer series most recently 10/2022 with bivalent booster    hgb 12.2->10.9->10.7  SBPs had been in the 80-90's but now in the 110's    A/P  Upper GIB with ABL anemia related to jejunal ulcers likely due to NSAIDs       No nsaids        BID PPI x one month then Q day, will stop pantop gtt       Ok for full liquids tonight       Bid hgb, consent signed and on chart, conditional orders to transfuse < 7.0       Hold pta lisinopril     Incidental COVID        No indication for treatment         Special precautions

## 2023-05-08 NOTE — CONSULTS
GASTROENTEROLOGY CONSULTATION      Ines Harry  [address] [place] [address]  77 year old female     Admission Date/Time: 5/8/2023  Primary Care Provider: Zen Early     We were asked to see the patient in consultation by Dr. Messina for evaluation of hematemesis and melena.    CC: hematemesis and melena     HPI:  Ines Harry is a 77 year old female with past medical history significant for hypertension, aortic stenosis, COPD, parotid gland cancer, depression, gastroesophageal reflux disease, prior esophageal ulcer, Marc-en-Y gastric bypass, admitted May 7 with hematemesis, melena, and anemia, COVID-19 positive.    Patient reports that last evening she was not feeling well.  She had a lump in her throat and regurgitated it back up and noticed it was bright red blood.  She then went to have a bowel movement and was black in color.  She went back to bed and shortly thereafter she had another bright red bloody emesis.  Due to this, she (admitted to the emergency room yesterday.  She denies any significant abdominal pain.  She denies any black emesis or bright red rectal bleeding. She had a few bouts of nonbloody emesis a couple days ago.    She last had an episode of melena around 3am today. Takes Aleve twice daily for years. No aspirin or blood thinners. Reports 1 alcohol drink per day.    History is notable for Marc-en-Y gastric bypass.  [facility]  A follow-up upper endoscopy in 2019 that showed esophageal erosion and a slightly different area, and esophageal stenosis that was empirically dilated, Marc-en-Y gastric bypass with mild stenosis at the gastrojejunal anastomosis, slight jejunal inflammation. Esophageal erosion biopsy showed coccoid bacteria on the squamous epithelial surface without ulceration, PAS stain was negative for fungus.  Mid esophageal biopsy was normal.  Small bowel jejunal biopsy showed focal reactive,  regenerative changes with an associated infiltrate of neutrophils and eosinophils.    She [facility] an additional [facility] more recently in [facility] for nausea, vomiting, weight loss, which showed a pill fragment in the stomach, gastritis, Marc-en-Y gastrojejunostomy with stenosis of the gastrojejunal anastomosis that was dilated.  Jejunal and gastric biopsy were normal.  Lower esophageal biopsy was normal.    Labs showed positive COVID-19 PCR. She does not have any upper respiratory symptoms. HGB 12.2 on admit, down to 10.9 this morning. Previous HGB in 2021 12.8. BUN elevated 28 with normal creatinine.      PAST MEDICAL HISTORY:  Patient Active Problem List    Diagnosis Date Noted     Melena 05/08/2023     Priority: Medium     Hematemesis with nausea 05/08/2023     Priority: Medium       ROS: A comprehensive ten point review of systems was negative aside from those in mentioned in the HPI.       MEDICATIONS:   Prior to Admission medications    Medication Sig Start Date End Date Taking? Authorizing Provider   fexofenadine (ALLEGRA) 180 MG tablet Take 180 mg by mouth daily   Yes Unknown, Entered By History   lisinopril (ZESTRIL) 10 MG tablet Take 10 mg by mouth daily   Yes Unknown, Entered By History   omeprazole (PRILOSEC) 20 MG DR capsule Take 20 mg by mouth daily   Yes Unknown, Entered By History   sertraline (ZOLOFT) 100 MG tablet Take 150 mg by mouth daily   Yes Unknown, Entered By History   traZODone (DESYREL) 50 MG tablet Take 50 mg by mouth At Bedtime   Yes Unknown, Entered By History        ALLERGIES: No Known Allergies     SOCIAL HISTORY:   1 alcohol drink per day.      FAMILY HISTORY:  No family history on file.     PHYSICAL EXAM:   BP 98/59   Pulse 84   Temp 98  F (36.7  C) (Oral)   Resp 16   SpO2 98%      PHYSICAL EXAM:  General: alert, oriented, NAD  SKIN: no suspicious lesions, rashes, jaundice, or spider angiomas  HEAD: Normocephalic. No masses,  lesions, tenderness or abnormalities  NECK: Neck supple. No adenopathy. Thyroid symmetric, normal size.  EYES: No scleral icterus  ENT: ENT exam normal, no neck nodes or sinus tenderness  RESPIRATORY: negative, Good diaphragmatic excursion. Lungs clear  CARDIOVASCULAR: negative, PMI normal. No lifts, heaves, or thrills. RRR. No murmurs, clicks gallops or rub  GASTROINTESTINAL: +BS, soft, NT, ND, no HSM, no masses/guarding/rebound  JOINT/EXTREMITIES: extremities normal- no gross deformities noted, gait normal and normal muscle tone  NEURO: Reflexes grossly normal and symmetric. Sensation grossly WNL.  PSYCH: no abnormal anxiety/depression  LYMPH: No anterior cervical, posterior cervical, or supraclavicular adenopathy     LABS:  I reviewed the patient's new clinical lab test results.   Recent Labs   Lab Test 05/08/23  0745 05/08/23 0420   WBC  --  9.6   HGB 10.9* 12.2   MCV  --  101*   PLT  --  183   INR  --  1.00     Recent Labs   Lab Test 05/08/23 0420      POTASSIUM 3.8   CHLORIDE 102   CO2 27   BUN 28.2*   ANIONGAP 9   KWESI 9.2     Recent Labs   Lab Test 05/08/23  0420   ALBUMIN 3.8   BILITOTAL 0.2   ALT 13   AST 25   ALKPHOS 76   LIPASE 30        IMAGING  None.      CONSULTATION ASSESSMENT AND PLAN:    77 year old female with past medical history significant for hypertension, aortic stenosis, COPD, parotid gland cancer, depression, gastroesophageal reflux disease, prior esophageal ulcer, Marc-en-Y gastric bypass, admitted May 7 with hematemesis, melena, and anemia, COVID-19 positive.    1. Hematemesis, melena. Suspect anastomotic ulcer related to NSAID use, possibly Tori Torres tear. Has a history of esophageal ulcer in 2018 on EGD through [facility]. Had more recent EGD through [facility] in 2021 showing stenosis of the gastrojejunal anastomosis that was dilated. Differential includes AVMs, H pylori related ulcer disease, neoplasm less likely.   --Patient is COVID positive with what appears to  be clinical resolution of GI bleeding with HBG 12.2-->10.9 this morning.   --Will discuss proceeding with EGD in light of COVID status or following conservatively and performing EGD only if clinical signs of bleeding/drop in HGB with Dr. Orourke. Outpatient EGD could be arranged in that scenario once recovered from COVID infection.   --Continue PPI infusion for now.   --Avoid NSAIDs.   --Monitor for signs of GI bleeding.   --NPO for now.     Will review with Dr. Orourke.     Total time spent:  Approximately, 40 minutes was spent providing patient care, including patient evaluation, reviewing documentation/test results, and . Thank you for asking us to participate in the care of this patient.    Terri Tony, PAC  [facility] ([facility])

## 2023-05-08 NOTE — PROGRESS NOTES
Care Management Follow Up    Length of Stay (days): 0    Expected Discharge Date: 05/09/2023       Additional Information:  Patient has COVID so SW is unable to go into the patient's room in the ED. SW attempted to call patient's cell phone on file for the [facility]. Patient's number is not in service. Will attempt once she get to OBS room.     MARILYN Bain, LGSW  Emergency Room   Please contact the [facility] on the floor in which the patient is staying for any questions or concerns

## 2023-05-08 NOTE — PHARMACY-ADMISSION MEDICATION HISTORY
Pharmacist Admission Medication History    Admission medication history is complete. The information provided in this note is only as accurate as the sources available at the time of the update.    Medication reconciliation/reorder completed by provider prior to medication history? No  Information Source(s): Patient via in-person  Pertinent Information: none    Changes made to PTA medication list:    Added: all meds    Deleted: None    Changed: None    Medication Affordability: no issues  Allergies reviewed with patient and updates made in EHR: yes  Medication History Completed By: Marcio Damon RPH 5/8/2023 8:48 AM    Prior to Admission medications    Medication Sig Last Dose Taking? Auth Provider Long Term End Date   fexofenadine (ALLEGRA) 180 MG tablet Take 180 mg by mouth daily 5/7/2023 at am Yes Unknown, Entered By History     lisinopril (ZESTRIL) 10 MG tablet Take 10 mg by mouth daily 5/7/2023 at am Yes Unknown, Entered By History Yes    omeprazole (PRILOSEC) 20 MG DR capsule Take 20 mg by mouth daily 5/7/2023 at am Yes Unknown, Entered By History     sertraline (ZOLOFT) 100 MG tablet Take 150 mg by mouth daily 5/7/2023 at am Yes Unknown, Entered By History Yes    traZODone (DESYREL) 50 MG tablet Take 50 mg by mouth At Bedtime 5/6/2023 at hs Yes Unknown, Entered By History Yes

## 2023-05-09 VITALS
HEART RATE: 83 BPM | WEIGHT: 112.8 LBS | DIASTOLIC BLOOD PRESSURE: 79 MMHG | BODY MASS INDEX: 18.13 KG/M2 | OXYGEN SATURATION: 99 % | SYSTOLIC BLOOD PRESSURE: 135 MMHG | RESPIRATION RATE: 17 BRPM | TEMPERATURE: 97.7 F | HEIGHT: 66 IN

## 2023-05-09 LAB
ANION GAP SERPL CALCULATED.3IONS-SCNC: 7 MMOL/L (ref 7–15)
BUN SERPL-MCNC: 20.9 MG/DL (ref 8–23)
CALCIUM SERPL-MCNC: 8.9 MG/DL (ref 8.8–10.2)
CHLORIDE SERPL-SCNC: 104 MMOL/L (ref 98–107)
CREAT SERPL-MCNC: 0.86 MG/DL (ref 0.51–0.95)
DEPRECATED HCO3 PLAS-SCNC: 27 MMOL/L (ref 22–29)
ERYTHROCYTE [DISTWIDTH] IN BLOOD BY AUTOMATED COUNT: 12.5 % (ref 10–15)
GFR SERPL CREATININE-BSD FRML MDRD: 69 ML/MIN/1.73M2
GLUCOSE SERPL-MCNC: 92 MG/DL (ref 70–99)
HCT VFR BLD AUTO: 33.3 % (ref 35–47)
HGB BLD-MCNC: 10.6 G/DL (ref 11.7–15.7)
MCH RBC QN AUTO: 32.5 PG (ref 26.5–33)
MCHC RBC AUTO-ENTMCNC: 31.8 G/DL (ref 31.5–36.5)
MCV RBC AUTO: 102 FL (ref 78–100)
PLATELET # BLD AUTO: 170 10E3/UL (ref 150–450)
POTASSIUM SERPL-SCNC: 4.3 MMOL/L (ref 3.4–5.3)
RBC # BLD AUTO: 3.26 10E6/UL (ref 3.8–5.2)
SODIUM SERPL-SCNC: 138 MMOL/L (ref 136–145)
UPPER GI ENDOSCOPY: NORMAL
WBC # BLD AUTO: 4.3 10E3/UL (ref 4–11)

## 2023-05-09 PROCEDURE — 99238 HOSP IP/OBS DSCHRG MGMT 30/<: CPT | Performed by: PHYSICIAN ASSISTANT

## 2023-05-09 PROCEDURE — 250N000013 HC RX MED GY IP 250 OP 250 PS 637: Performed by: INTERNAL MEDICINE

## 2023-05-09 PROCEDURE — 96376 TX/PRO/DX INJ SAME DRUG ADON: CPT

## 2023-05-09 PROCEDURE — G0378 HOSPITAL OBSERVATION PER HR: HCPCS

## 2023-05-09 PROCEDURE — C9113 INJ PANTOPRAZOLE SODIUM, VIA: HCPCS | Performed by: INTERNAL MEDICINE

## 2023-05-09 PROCEDURE — 85027 COMPLETE CBC AUTOMATED: CPT | Performed by: INTERNAL MEDICINE

## 2023-05-09 PROCEDURE — 250N000011 HC RX IP 250 OP 636: Performed by: INTERNAL MEDICINE

## 2023-05-09 PROCEDURE — 36415 COLL VENOUS BLD VENIPUNCTURE: CPT | Performed by: INTERNAL MEDICINE

## 2023-05-09 PROCEDURE — 82310 ASSAY OF CALCIUM: CPT | Performed by: INTERNAL MEDICINE

## 2023-05-09 RX ADMIN — SUCRALFATE ORAL 1 G: 1 SUSPENSION ORAL at 10:36

## 2023-05-09 RX ADMIN — SUCRALFATE ORAL 1 G: 1 SUSPENSION ORAL at 06:48

## 2023-05-09 RX ADMIN — PANTOPRAZOLE SODIUM 40 MG: 40 INJECTION, POWDER, FOR SOLUTION INTRAVENOUS at 08:49

## 2023-05-09 RX ADMIN — SERTRALINE HYDROCHLORIDE 150 MG: 100 TABLET ORAL at 08:49

## 2023-05-09 RX ADMIN — ACETAMINOPHEN 650 MG: 325 TABLET ORAL at 10:35

## 2023-05-09 ASSESSMENT — ACTIVITIES OF DAILY LIVING (ADL)
ADLS_ACUITY_SCORE: 35

## 2023-05-09 NOTE — DISCHARGE SUMMARY
"[facility]  Hospitalist Discharge Summary      Date of Admission:  5/8/2023  Date of Discharge:  5/9/2023  Discharging Provider: Janelle Valverde PA-C  Discharge Service: Hospitalist Service    Discharge Diagnoses   Melena  Hematemesis      Follow-ups Needed After Discharge   Follow-up Appointments     Follow-up and recommended labs and tests       Follow-up with your primary care doctor as needed.  We have provided you   with 3 months of omeprazole twice daily with one 3-month refill.             Unresulted Labs Ordered in the Past 30 Days of this Admission     Date and Time Order Name Status Description    5/8/2023  2:05 PM Surgical Pathology Exam In process       These results will be followed up by gastroenterology    Discharge Disposition   Discharged to home  Condition at discharge: Stable      Hospital Course   Ines Harry is a 77 year old female with a history of hypertension, GERD, mild aortic stenosis, COPD, parotid gland cancer, depression, and prior duodenal ulcer who presents with hematemesis and melena.    \"The patient reported some intermittent upset stomach over the past 2 to 3 days.  She had a couple episodes of mild clear vomiting earlier in the weekend.  She admits to some mild nausea but no diarrhea.  She denies abdominal pain.  She describes these as \"flulike symptoms\".  Overnight the patient developed nausea and had 1 episode of hematemesis (described as an initial episode of vomiting of bright red blood).  Subsequently she had a dark bowel movement that she describes as black.  She does take daily Prilosec.  She admits to having 1 glass of wine a day and occasional but rare ibuprofen use.  She does not take aspirin or any anticoagulants.  She became concerned because she has a history of a duodenal ulcer so came to the hospital for evaluation\"    In the ED her temperature is 97.7, heart rate 83, blood pressure 92/59, respirate 16 and oxygen saturation 98% on " room air.  Labs show normal basic metabolic panel except for a BUN of 28.  Liver function tests are normal.  Blood sugar is 143 and lipase is 30.  CBC normal.  She tested positive for COVID-19.    She was admitted under observation with gastroenterology consultation.  EGD was performed on 5/8 showing nonbleeding jejunal ulcers with no stigmata of bleeding.  GI thought the source of melena/hematemesis was likely from here.  They recommend she stop NSAIDs and do omeprazole 20 mg twice daily for 1 month and then omeprazole 20 mg daily.    Following the procedure she was able to tolerate a regular diet and she had no COVID symptoms so was discharged home.          Consultations This Hospital Stay   GASTROENTEROLOGY IP CONSULT    Code Status   Full Code    Time Spent on this Encounter   I, Janelle Valverde PA-C, personally saw the patient today and spent less than or equal to 30 minutes discharging this patient.       Janelle Valverde PA-C  [facility] OBSERVATION DEPT  [address] [address]  Phone: [phone]  ______________________________________________________________________    Physical Exam   Vital Signs: Temp: 98  F (36.7  C) Temp src: Oral BP: 136/75 Pulse: 78   Resp: 18 SpO2: 95 % O2 Device: None (Room air) Oxygen Delivery: 2 LPM  Weight: 112 lbs 12.8 oz  General Appearance: Alert and oriented x3  Respiratory: Clear to auscultation bilaterally  Cardiovascular: RRR without murmur  GI: Bowel sounds are present without tenderness  Skin: No rashes or open sores are noted         Primary Care Physician   [facility]    Discharge Orders      Follow-up and recommended labs and tests     Follow-up with your primary care doctor as needed.  We have provided you with 3 months of omeprazole twice daily with one 3-month refill.     Activity    Your activity upon discharge: activity as tolerated     Reason for your hospital stay    You were admitted for  concerns of blood in your stool.  This was self-limited and fortunately your hemoglobin is remained stable.  Gastroenterology was consulted and did an EGD with findings of nonbleeding jejunal ulcers.  They recommend you stop taking NSAIDs and increase your omeprazole from 20 mg daily to 20 mg twice daily for 1 month and then reduce back to Omeprazole 20 mg daily.    Incidentally you also were found to have COVID.  We are unsure if this is an active infection so you should probably wear a mask for the next few days and avoid public outings for a few days.     Diet    Follow this diet upon discharge: Regular       Significant Results and Procedures   Most Recent 3 CBC's:Recent Labs   Lab Test 05/09/23  0533 05/08/23  1813 05/08/23  1218 05/08/23  0745 05/08/23  0420   WBC 4.3  --   --   --  9.6   HGB 10.6* 10.6* 10.7*   < > 12.2   *  --   --   --  101*     --   --   --  183    < > = values in this interval not displayed.     Most Recent 3 BMP's:Recent Labs   Lab Test 05/09/23  0533 05/08/23  0420    138   POTASSIUM 4.3 3.8   CHLORIDE 104 102   CO2 27 27   BUN 20.9 28.2*   CR 0.86 0.80   ANIONGAP 7 9   KWESI 8.9 9.2   GLC 92 143*   , No results found for this or any previous visit.    Discharge Medications   Current Discharge Medication List      CONTINUE these medications which have CHANGED    Details   omeprazole (PRILOSEC) 20 MG DR capsule Take 1 capsule (20 mg) by mouth 2 times daily For 1 month and then reduce to once daily  Qty: 60 capsule, Refills: 1    Associated Diagnoses: Hematemesis with nausea; Melena; Infection due to 2019 novel coronavirus         CONTINUE these medications which have NOT CHANGED    Details   fexofenadine (ALLEGRA) 180 MG tablet Take 180 mg by mouth daily      lisinopril (ZESTRIL) 10 MG tablet Take 10 mg by mouth daily      sertraline (ZOLOFT) 100 MG tablet Take 150 mg by mouth daily      traZODone (DESYREL) 50 MG tablet Take 50 mg by mouth At Bedtime           Allergies    No Known Allergies

## 2023-05-09 NOTE — PLAN OF CARE
PRIMARY DIAGNOSIS: MELENA, HEMATEMESIS     OUTPATIENT/OBSERVATION GOALS TO BE MET BEFORE DISCHARGE  1. Orthostatic performed: No    2. Stable Hgb Yes.   Recent Labs   Lab Test 05/08/23  1813 05/08/23  1218 05/08/23  0745   HGB 10.6* 10.7* 10.9*       3. Resolved or declined bleeding episodes: Yes Last episode: Early Sunday morning per pt report    4. Appropriate testing complete: Yes    5. Cleared for discharge by consultants (if involved): No    6. Safe discharge environment identified: Yes    Discharge Planner Nurse   Safe discharge environment identified: Yes  Barriers to discharge: Yes       Entered by: Susu Rosas RN 05/09/2023 5:29 AM     Vitals are Temp: 97.8  F (36.6  C) Temp src: Oral BP: (!) 148/85 Pulse: 78   Resp: 16 SpO2: 97 %.  Pt sleeping. No emesis or bm. Ind in room. Tele SR. Will continue to monitor.     Please review provider order for any additional goals.   Nurse to notify provider when observation goals have been met and patient is ready for discharge.

## 2023-05-09 NOTE — PLAN OF CARE
PRIMARY DIAGNOSIS: MELENA, HEMATEMESIS     OUTPATIENT/OBSERVATION GOALS TO BE MET BEFORE DISCHARGE  1. Orthostatic performed: No    2. Stable Hgb Yes.   Recent Labs   Lab Test 05/08/23  1813 05/08/23  1218 05/08/23  0745   HGB 10.6* 10.7* 10.9*       3. Resolved or declined bleeding episodes: Yes Last episode: Early Sunday morning per pt report    4. Appropriate testing complete: Yes    5. Cleared for discharge by consultants (if involved): No    6. Safe discharge environment identified: Yes    Discharge Planner Nurse   Safe discharge environment identified: Yes  Barriers to discharge: Yes       Entered by: Susu Rosas RN 05/09/2023 12:15 AM     Vitals are Temp: 97.8  F (36.6  C) Temp src: Oral BP: (!) 148/85 Pulse: 78   Resp: 16 SpO2: 97 %.  Patient is AOx4. Pt denies any melena or hematemesis. Denies pain, nausea, dizziness, lightheadedness, and SOB. Ind in room. Tolerating full liquids. PIV SL x2. On special precautions. RA satting high 90's. Will continue to monitor and provide supportive cares.     Please review provider order for any additional goals.   Nurse to notify provider when observation goals have been met and patient is ready for discharge.

## 2023-05-09 NOTE — PLAN OF CARE
Assumed cares at 1700: A&Ox4. VSS on RA. Up in room independently. LS clear, anteriorly. +BS. No BM this shift. Voiding via BR. Tele. Denies pain/N/V. Full liquid diet, tolerating. PIV x 2, SL. COVID precautions maintained. Hgb 10.6. GI following. EGD completed this afternoon. PTA transparent dressing to R shin.

## 2023-05-09 NOTE — PLAN OF CARE
PRIMARY DIAGNOSIS:  MELENA, HEMATEMESIS     OUTPATIENT/OBSERVATION GOALS TO BE MET BEFORE DISCHARGE  1. Orthostatic performed: No    2. Stable Hgb Yes.   Recent Labs   Lab Test 05/08/23  1813 05/08/23  1218 05/08/23  0745   HGB 10.6* 10.7* 10.9*       3. Resolved or declined bleeding episodes: Yes Last episode: Early Sunday morning per pt report    4. Appropriate testing complete: Yes    5. Cleared for discharge by consultants (if involved): No    6. Safe discharge environment identified: Yes    Discharge Planner Nurse   Safe discharge environment identified: Yes  Barriers to discharge: Yes       Entered by: Susu Rosas RN 05/08/2023 8:00 PM     Vitals are Temp: 98.2  F (36.8  C) Temp src: Oral BP: 134/78 Pulse: 80   Resp: 16 SpO2: 96 %.  Patient is AOx4. Pt denies any melena or hematemesis. Denies pain, nausea, dizziness, lightheadedness, and SOB. Ind in room. Tolerating full liquids. PIV SL x2. On special precautions. RA satting high 90's. Will continue to monitor and provide supportive cares.     Please review provider order for any additional goals.   Nurse to notify provider when observation goals have been met and patient is ready for discharge.

## 2023-05-10 LAB
PATH REPORT.COMMENTS IMP SPEC: NORMAL
PATH REPORT.COMMENTS IMP SPEC: NORMAL
PATH REPORT.FINAL DX SPEC: NORMAL
PATH REPORT.GROSS SPEC: NORMAL
PATH REPORT.MICROSCOPIC SPEC OTHER STN: NORMAL
PATH REPORT.MICROSCOPIC SPEC OTHER STN: NORMAL
PATH REPORT.RELEVANT HX SPEC: NORMAL
PHOTO IMAGE: NORMAL

## 2023-08-13 ENCOUNTER — HEALTH MAINTENANCE LETTER (OUTPATIENT)
Age: 78
End: 2023-08-13

## 2023-09-07 ENCOUNTER — HOSPITAL ENCOUNTER (INPATIENT)
Facility: CLINIC | Age: 78
LOS: 3 days | Discharge: HOME HEALTH | DRG: 522 | End: 2023-09-10
Attending: EMERGENCY MEDICINE | Admitting: INTERNAL MEDICINE
Payer: COMMERCIAL

## 2023-09-07 ENCOUNTER — APPOINTMENT (OUTPATIENT)
Dept: CARDIOLOGY | Facility: CLINIC | Age: 78
DRG: 522 | End: 2023-09-07
Attending: PHYSICIAN ASSISTANT
Payer: COMMERCIAL

## 2023-09-07 DIAGNOSIS — S81.801A OPEN WOUND OF LOWER LIMB, RIGHT, INITIAL ENCOUNTER: ICD-10-CM

## 2023-09-07 DIAGNOSIS — S72.001A HIP FRACTURE, RIGHT, CLOSED, INITIAL ENCOUNTER (H): Primary | ICD-10-CM

## 2023-09-07 DIAGNOSIS — S72.001A CLOSED FRACTURE OF NECK OF RIGHT FEMUR, INITIAL ENCOUNTER (H): ICD-10-CM

## 2023-09-07 LAB
ABO/RH(D): NORMAL
ANTIBODY SCREEN: NEGATIVE
CREAT SERPL-MCNC: 0.64 MG/DL (ref 0.51–0.95)
EGFRCR SERPLBLD CKD-EPI 2021: >90 ML/MIN/1.73M2
HOLD SPECIMEN: NORMAL
LVEF ECHO: NORMAL
MAGNESIUM SERPL-MCNC: 1.8 MG/DL (ref 1.7–2.3)
MAGNESIUM SERPL-MCNC: 1.8 MG/DL (ref 1.7–2.3)
POTASSIUM SERPL-SCNC: 3.8 MMOL/L (ref 3.4–5.3)
SPECIMEN EXPIRATION DATE: NORMAL

## 2023-09-07 PROCEDURE — 96374 THER/PROPH/DIAG INJ IV PUSH: CPT

## 2023-09-07 PROCEDURE — 250N000011 HC RX IP 250 OP 636: Mod: JZ | Performed by: EMERGENCY MEDICINE

## 2023-09-07 PROCEDURE — 93321 DOPPLER ECHO F-UP/LMTD STD: CPT | Mod: 26 | Performed by: INTERNAL MEDICINE

## 2023-09-07 PROCEDURE — 83735 ASSAY OF MAGNESIUM: CPT | Performed by: INTERNAL MEDICINE

## 2023-09-07 PROCEDURE — 84132 ASSAY OF SERUM POTASSIUM: CPT | Performed by: INTERNAL MEDICINE

## 2023-09-07 PROCEDURE — 86850 RBC ANTIBODY SCREEN: CPT | Performed by: INTERNAL MEDICINE

## 2023-09-07 PROCEDURE — 250N000013 HC RX MED GY IP 250 OP 250 PS 637: Performed by: PHYSICIAN ASSISTANT

## 2023-09-07 PROCEDURE — 99285 EMERGENCY DEPT VISIT HI MDM: CPT | Mod: 25

## 2023-09-07 PROCEDURE — 99222 1ST HOSP IP/OBS MODERATE 55: CPT | Performed by: PHYSICIAN ASSISTANT

## 2023-09-07 PROCEDURE — 82306 VITAMIN D 25 HYDROXY: CPT | Performed by: PHYSICIAN ASSISTANT

## 2023-09-07 PROCEDURE — 36415 COLL VENOUS BLD VENIPUNCTURE: CPT | Performed by: EMERGENCY MEDICINE

## 2023-09-07 PROCEDURE — 120N000001 HC R&B MED SURG/OB

## 2023-09-07 PROCEDURE — 255N000002 HC RX 255 OP 636: Performed by: INTERNAL MEDICINE

## 2023-09-07 PROCEDURE — 82565 ASSAY OF CREATININE: CPT | Performed by: INTERNAL MEDICINE

## 2023-09-07 PROCEDURE — 86901 BLOOD TYPING SEROLOGIC RH(D): CPT | Performed by: INTERNAL MEDICINE

## 2023-09-07 PROCEDURE — 93325 DOPPLER ECHO COLOR FLOW MAPG: CPT | Mod: 26 | Performed by: INTERNAL MEDICINE

## 2023-09-07 PROCEDURE — 250N000013 HC RX MED GY IP 250 OP 250 PS 637: Performed by: INTERNAL MEDICINE

## 2023-09-07 PROCEDURE — 96375 TX/PRO/DX INJ NEW DRUG ADDON: CPT

## 2023-09-07 PROCEDURE — 93308 TTE F-UP OR LMTD: CPT | Mod: 26 | Performed by: INTERNAL MEDICINE

## 2023-09-07 PROCEDURE — 250N000011 HC RX IP 250 OP 636: Mod: JZ | Performed by: PHYSICIAN ASSISTANT

## 2023-09-07 PROCEDURE — 36415 COLL VENOUS BLD VENIPUNCTURE: CPT | Performed by: INTERNAL MEDICINE

## 2023-09-07 RX ORDER — HYDROMORPHONE HCL IN WATER/PF 6 MG/30 ML
0.2 PATIENT CONTROLLED ANALGESIA SYRINGE INTRAVENOUS
Status: DISCONTINUED | OUTPATIENT
Start: 2023-09-07 | End: 2023-09-08

## 2023-09-07 RX ORDER — ONDANSETRON 2 MG/ML
4 INJECTION INTRAMUSCULAR; INTRAVENOUS ONCE
Status: COMPLETED | OUTPATIENT
Start: 2023-09-07 | End: 2023-09-07

## 2023-09-07 RX ORDER — AMOXICILLIN 250 MG
2 CAPSULE ORAL 2 TIMES DAILY PRN
Status: DISCONTINUED | OUTPATIENT
Start: 2023-09-07 | End: 2023-09-10 | Stop reason: HOSPADM

## 2023-09-07 RX ORDER — ONDANSETRON 4 MG/1
4 TABLET, ORALLY DISINTEGRATING ORAL EVERY 6 HOURS PRN
Status: DISCONTINUED | OUTPATIENT
Start: 2023-09-07 | End: 2023-09-08

## 2023-09-07 RX ORDER — VITAMIN B COMPLEX
50 TABLET ORAL DAILY
Status: DISCONTINUED | OUTPATIENT
Start: 2023-09-07 | End: 2023-09-10 | Stop reason: HOSPADM

## 2023-09-07 RX ORDER — MORPHINE SULFATE 4 MG/ML
4 INJECTION, SOLUTION INTRAMUSCULAR; INTRAVENOUS ONCE
Status: COMPLETED | OUTPATIENT
Start: 2023-09-07 | End: 2023-09-07

## 2023-09-07 RX ORDER — ACETAMINOPHEN 325 MG/1
975 TABLET ORAL 3 TIMES DAILY
Status: DISCONTINUED | OUTPATIENT
Start: 2023-09-07 | End: 2023-09-08

## 2023-09-07 RX ORDER — METHOCARBAMOL 500 MG/1
500 TABLET, FILM COATED ORAL 4 TIMES DAILY PRN
Status: DISCONTINUED | OUTPATIENT
Start: 2023-09-07 | End: 2023-09-10 | Stop reason: HOSPADM

## 2023-09-07 RX ORDER — AMOXICILLIN 250 MG
1 CAPSULE ORAL 2 TIMES DAILY PRN
Status: DISCONTINUED | OUTPATIENT
Start: 2023-09-07 | End: 2023-09-10 | Stop reason: HOSPADM

## 2023-09-07 RX ORDER — PANTOPRAZOLE SODIUM 40 MG/1
40 TABLET, DELAYED RELEASE ORAL 2 TIMES DAILY
Status: DISCONTINUED | OUTPATIENT
Start: 2023-09-07 | End: 2023-09-10 | Stop reason: HOSPADM

## 2023-09-07 RX ORDER — HYDROMORPHONE HCL IN WATER/PF 6 MG/30 ML
0.4 PATIENT CONTROLLED ANALGESIA SYRINGE INTRAVENOUS
Status: DISCONTINUED | OUTPATIENT
Start: 2023-09-07 | End: 2023-09-08

## 2023-09-07 RX ORDER — ONDANSETRON 2 MG/ML
4 INJECTION INTRAMUSCULAR; INTRAVENOUS EVERY 6 HOURS PRN
Status: DISCONTINUED | OUTPATIENT
Start: 2023-09-07 | End: 2023-09-08

## 2023-09-07 RX ORDER — LIDOCAINE 40 MG/G
CREAM TOPICAL
Status: DISCONTINUED | OUTPATIENT
Start: 2023-09-07 | End: 2023-09-10 | Stop reason: HOSPADM

## 2023-09-07 RX ORDER — TRAZODONE HYDROCHLORIDE 50 MG/1
50 TABLET, FILM COATED ORAL AT BEDTIME
Status: DISCONTINUED | OUTPATIENT
Start: 2023-09-07 | End: 2023-09-10 | Stop reason: HOSPADM

## 2023-09-07 RX ORDER — OXYCODONE HYDROCHLORIDE 5 MG/1
5 TABLET ORAL EVERY 4 HOURS PRN
Status: DISCONTINUED | OUTPATIENT
Start: 2023-09-07 | End: 2023-09-08

## 2023-09-07 RX ORDER — HYDROXYZINE HYDROCHLORIDE 10 MG/1
10 TABLET, FILM COATED ORAL 3 TIMES DAILY PRN
Status: DISCONTINUED | OUTPATIENT
Start: 2023-09-07 | End: 2023-09-08

## 2023-09-07 RX ADMIN — HYDROMORPHONE HYDROCHLORIDE 0.4 MG: 0.2 INJECTION, SOLUTION INTRAMUSCULAR; INTRAVENOUS; SUBCUTANEOUS at 16:11

## 2023-09-07 RX ADMIN — PANTOPRAZOLE SODIUM 40 MG: 40 TABLET, DELAYED RELEASE ORAL at 20:36

## 2023-09-07 RX ADMIN — OXYCODONE HYDROCHLORIDE 2.5 MG: 5 TABLET ORAL at 20:36

## 2023-09-07 RX ADMIN — TRAZODONE HYDROCHLORIDE 50 MG: 50 TABLET ORAL at 21:42

## 2023-09-07 RX ADMIN — HUMAN ALBUMIN MICROSPHERES AND PERFLUTREN 3 ML: 10; .22 INJECTION, SOLUTION INTRAVENOUS at 16:00

## 2023-09-07 RX ADMIN — METHOCARBAMOL 500 MG: 500 TABLET ORAL at 22:09

## 2023-09-07 RX ADMIN — ACETAMINOPHEN 975 MG: 325 TABLET, FILM COATED ORAL at 20:35

## 2023-09-07 RX ADMIN — Medication 50 MCG: at 15:57

## 2023-09-07 RX ADMIN — HYDROXYZINE HYDROCHLORIDE 10 MG: 10 TABLET ORAL at 15:56

## 2023-09-07 RX ADMIN — HYDROMORPHONE HYDROCHLORIDE 0.4 MG: 0.2 INJECTION, SOLUTION INTRAMUSCULAR; INTRAVENOUS; SUBCUTANEOUS at 23:46

## 2023-09-07 RX ADMIN — ONDANSETRON 4 MG: 2 INJECTION INTRAMUSCULAR; INTRAVENOUS at 14:42

## 2023-09-07 RX ADMIN — MORPHINE SULFATE 4 MG: 4 INJECTION, SOLUTION INTRAMUSCULAR; INTRAVENOUS at 14:43

## 2023-09-07 RX ADMIN — HYDROMORPHONE HYDROCHLORIDE 0.2 MG: 0.2 INJECTION, SOLUTION INTRAMUSCULAR; INTRAVENOUS; SUBCUTANEOUS at 21:42

## 2023-09-07 RX ADMIN — ACETAMINOPHEN 975 MG: 325 TABLET, FILM COATED ORAL at 15:58

## 2023-09-07 ASSESSMENT — ACTIVITIES OF DAILY LIVING (ADL)
ADLS_ACUITY_SCORE: 35
ADLS_ACUITY_SCORE: 49
ADLS_ACUITY_SCORE: 35

## 2023-09-07 ASSESSMENT — ENCOUNTER SYMPTOMS
WEAKNESS: 0
BACK PAIN: 0
NECK PAIN: 0
NUMBNESS: 0
ARTHRALGIAS: 1
HEADACHES: 0

## 2023-09-07 NOTE — PHARMACY-ADMISSION MEDICATION HISTORY
Pharmacist Admission Medication History    Admission medication history is complete. The information provided in this note is only as accurate as the sources available at the time of the update.    Medication reconciliation/reorder completed by provider prior to medication history? No    Information Source(s): Patient via in-person    Pertinent Information: none    Changes made to PTA medication list:  Added: None  Deleted: None  Changed: None    Medication Affordability:       Allergies reviewed with patient and updates made in EHR: yes    Medication History Completed By: James Shin RPH 9/7/2023 5:26 PM    Prior to Admission medications    Medication Sig Last Dose Taking? Auth Provider Long Term End Date   fexofenadine (ALLEGRA) 180 MG tablet Take 180 mg by mouth daily 9/7/2023 Yes Unknown, Entered By History     lisinopril (ZESTRIL) 10 MG tablet Take 10 mg by mouth daily 9/7/2023 Yes Unknown, Entered By History Yes    omeprazole (PRILOSEC) 20 MG DR capsule Take 20 mg by mouth daily 9/7/2023 Yes Unknown, Entered By History     sertraline (ZOLOFT) 100 MG tablet Take 150 mg by mouth daily 9/7/2023 Yes Unknown, Entered By History Yes    traZODone (DESYREL) 50 MG tablet Take 50 mg by mouth At Bedtime 9/6/2023 Yes Unknown, Entered By History Yes

## 2023-09-07 NOTE — ED NOTES
Bed: ED10  Expected date: 9/7/23  Expected time: 1:40 PM  Means of arrival: Ambulance  Comments:  LARISSA

## 2023-09-07 NOTE — ED TRIAGE NOTES
Pt arrives via EMS from [facility] for right femoral head fracture. Pt was doing yard work yesterday when she tripped over a hose onto her right side. Went to urgent care this morning where they discovered the hip fracture. Tried to admit to ortho but were unsuccessful so sent pt to ED. History of hypertension, asthma, and COPD. 150mcg Fentanyl given total with the last 50mcg dose given at 1314 by EMS. EMS placed 18g in right forearm.

## 2023-09-07 NOTE — ED PROVIDER NOTES
History     Chief Complaint:  hip fracture       HPI   Ines Harry is a 77 year old female presents to the emergency department for right hip pain.  Patient states that roughly around 4:00 yesterday afternoon, she was outside mowing her lawn and hosing off the bag when she suddenly tripped over the hoses and landed on her right hip.  Patient subsequently landed on her back.  Patient denies any head or neck injury.  No loss of consciousness.  Not on blood thinners.  She denies any neck or back pain.  She was having difficulty sleep last night due to her pain and Tylenol and Aleve did not touch the pain.  Patient rates the pain 8 out of 10 and is sharp with radiation down right leg.  No associated numbness or weakness.  Patient states that today, her pain became so unbearable, she went to the OR with the friend's assistance and a mop handle.  Patient was evaluated at [facility] and found to have a right femoral neck fracture and sent to [facility] ED for orthopedic consultation.    Review of Systems   Musculoskeletal:  Positive for arthralgias (Right hip). Negative for back pain and neck pain.   Neurological:  Negative for weakness, numbness and headaches.     Independent Historian:   None - Patient Only    Review of External Notes:   9/7/23 UR note     XR HIP 2 OR 3 VIEWS W PELVIS RIGHT     Result Date: 9/7/2023  For Patients: As a result of the 21st Century Cures Act, medical imaging exams and procedure reports are released immediately into your electronic medical record. You may view this report before your referring provider. If you have questions, please contact your health care provider. EXAM: XR HIP 2 OR 3 VIEWS W PELVIS RIGHT LOCATION: [facility] [place] DATE: 9/7/2023 INDICATION: Right hip pain after fall. COMPARISON: None.      There is an impacted subcapital right femoral neck fracture. No displacement. No dislocation present. Soft tissues are unremarkable. Degenerative change with fusion hardware in the  "lumbar spine.       Medications:    No current outpatient medications on file.      Past Medical History:    No past medical history on file.    Past Surgical History:    Past Surgical History:   Procedure Laterality Date    ESOPHAGOSCOPY, GASTROSCOPY, DUODENOSCOPY (EGD), COMBINED N/A 5/8/2023    Procedure: Esophagoscopy, gastroscopy, duodenoscopy (EGD), combined with biopsies for h.Pylori;  Surgeon: Emanuel Orourke MD;  Location:  GI        Physical Exam   Patient Vitals for the past 24 hrs:   BP Temp Temp src Pulse Resp SpO2 Height Weight   09/07/23 1919 123/58 97.4  F (36.3  C) Tympanic 67 16 98 % -- 53.7 kg (118 lb 6.2 oz)   09/07/23 1752 -- -- -- -- -- 98 % -- --   09/07/23 1751 -- -- -- -- -- 90 % -- --   09/07/23 1750 -- -- -- -- -- (!) 89 % -- --   09/07/23 1700 103/63 -- -- 67 16 93 % -- --   09/07/23 1600 (!) 136/104 -- -- 67 18 95 % -- --   09/07/23 1530 132/72 -- -- 68 -- 94 % -- --   09/07/23 1515 119/69 -- -- 70 -- 97 % -- --   09/07/23 1500 134/72 -- -- 69 -- 97 % -- --   09/07/23 1445 127/74 -- -- 71 -- 98 % -- --   09/07/23 1430 139/79 -- -- 67 -- 97 % -- --   09/07/23 1415 (!) 142/77 -- -- 66 -- 96 % -- --   09/07/23 1400 (!) 144/102 -- -- 67 -- 99 % -- --   09/07/23 1354 (!) 145/85 -- -- 62 18 100 % 1.664 m (5' 5.5\") 51.3 kg (113 lb)        Constitutional:       Appearance: Normal appearance.      General: Not in acute distress.  HENT:      Head: Normocephalic and atraumatic.   Eyes:      Extraocular Movements: Extraocular movements intact.      Conjunctiva/sclera: Conjunctivae normal.   Cardiovascular:      Rate and Rhythm: Normal rate and regular rhythm.   Pulmonary:      Effort: Pulmonary effort is normal. No respiratory distress.   Abdominal:      General: Abdomen is flat. There is no distension.   Musculoskeletal:         General: No swelling or deformity.      Cervical back: Normal range of motion. No rigidity.      Right lower extremity: Right lateral hip point tenderness to " palpation.  2+ right dorsalis pedis pulse.  Cap refill less than 2 seconds.  Normal movements of toes.  Skin:     Coloration: Skin is not jaundiced or pale.   Neurological:      General: No focal deficit present.      Mental Status: Alert and oriented to person, place, and time.   Psychiatric:         Mood and Affect: Mood normal.         Behavior: Behavior normal.    Emergency Department Course     Imaging:  Echo Limited   Final Result         Report per radiology    Laboratory:  Labs Ordered and Resulted from Time of ED Arrival to Time of ED Departure   VITAMIN D DEFICIENCY SCREENING   TYPE AND SCREEN, ADULT       Result Value    ABO/RH(D) O POS      Antibody Screen Negative      SPECIMEN EXPIRATION DATE [id number]     ABO/RH TYPE AND SCREEN        Emergency Department Course & Assessments:             Interventions:  Medications   Vitamin D3 (CHOLECALCIFEROL) tablet 50 mcg (50 mcg Oral $Given 9/7/23 1557)   methocarbamol (ROBAXIN) tablet 500 mg (has no administration in time range)   hydrOXYzine (ATARAX) tablet 10 mg (10 mg Oral $Given 9/7/23 1556)   lidocaine 1 % 0.1-1 mL (has no administration in time range)   lidocaine (LMX4) cream (has no administration in time range)   sodium chloride (PF) 0.9% PF flush 3 mL (3 mLs Intracatheter $Given 9/7/23 1735)   sodium chloride (PF) 0.9% PF flush 3 mL (has no administration in time range)   acetaminophen (TYLENOL) tablet 975 mg (975 mg Oral $Given 9/7/23 2035)   oxyCODONE IR (ROXICODONE) half-tab 2.5 mg (2.5 mg Oral $Given 9/7/23 2036)   oxyCODONE (ROXICODONE) tablet 5 mg (has no administration in time range)   HYDROmorphone (DILAUDID) injection 0.2 mg (0.2 mg Intravenous $Given 9/7/23 2142)   HYDROmorphone (DILAUDID) injection 0.4 mg (0.4 mg Intravenous $Given 9/7/23 1611)   senna-docusate (SENOKOT-S/PERICOLACE) 8.6-50 MG per tablet 1 tablet (has no administration in time range)     Or   senna-docusate (SENOKOT-S/PERICOLACE) 8.6-50 MG per tablet 2 tablet (has no  administration in time range)   ondansetron (ZOFRAN ODT) ODT tab 4 mg (has no administration in time range)     Or   ondansetron (ZOFRAN) injection 4 mg (has no administration in time range)   pantoprazole (PROTONIX) EC tablet 40 mg (40 mg Oral $Given 23)   sertraline (ZOLOFT) tablet 150 mg (has no administration in time range)   traZODone (DESYREL) tablet 50 mg (50 mg Oral $Given 23)   morphine (PF) injection 4 mg (4 mg Intravenous $Given 23 1443)   ondansetron (ZOFRAN) injection 4 mg (4 mg Intravenous $Given 23 1442)   perflutren diluted 1mL to 2mL with saline (OPTISON) diluted injection 3 mL (3 mLs Intravenous $Given 23 1600)   sodium chloride (PF) 0.9% PF flush 10 mL (10 mLs Intracatheter $Given 23 1600)        Independent Interpretation (X-rays, CTs, rhythm strip):  None    Assessments/Consultations/Discussion of Management or Tests:  ED Course as of 23 2151   Thu Sep 07, 2023   144 Discussed patient with Portia PRYOR with orthopedics who will evaluate the patient and requests hospitalist admit. Likely OR in AM.   1544 Discussed patient with hospitalist ROYA Murray who accepted patient for Dr. Hall..       Social Determinants of Health affecting care:   None    Disposition:  The patient was admitted to the hospital under the care of Dr. Hall.     Impression & Plan    CMS Diagnoses: None    Medical Decision Makin-year-old female as described above presents to the emergency department for right hip fracture sent in from  after a mechanical fall yesterday afternoon.  Patient hemodynamically stable at time evaluation.  Afebrile.  Neurovascular intact in right lower extremity.  Will consult orthopedics for right hip fracture.  Likely admission.  Outpatient lab work and imaging reviewed.  Discussed care plan with patient voiced understanding and agreement plan.  Answered all questions.  Additional work-up and orders as listed in chart.    Please refer to ED  course above for details on the patient's treatment course and any changes or updates in care plan beyond my initial evaluation and MDM.      Diagnosis:    ICD-10-CM    1. Hip fracture, right, closed, initial encounter (H)  S72.001A       2. Closed fracture of neck of right femur, initial encounter (H)  S72.001A Case Request: Right total hip arthroplasty vs femoral neck fracture in-situ screw fixation     Case Request: Right total hip arthroplasty vs femoral neck fracture in-situ screw fixation           Discharge Medications:  Current Discharge Medication List             IRINA CHAVEZ DO  9/7/2023   Irina Chavez DO Yeh, Ferris, DO  09/07/23 2154

## 2023-09-07 NOTE — H&P
[facility]  Internal Medicine  History and Physical      Patient Name: Ines Harry MRN# [id number]   Age: 77 year old YOB: 1945     Date of Admission:9/7/2023    Primary care provider: Monie Ordoñez  Date of Service: 9/7/2023         Assessment and Plan:   Ines Harry is a 77 year old female with a history of Spinal Stenosis, Duodenal Ulcer, RYGB, HTN, Asthma, Nephrolithiasis, Seasonal Allergies, Aortic Stenosis, Aortic Regurgitation, SCC of the Parotid Gland, Depression who presents via EMS from [facility] for right femoral head fracture.     Right Femoral Neck Fracture - Right Hip Xray revealed an impacted subcapital right femoral neck fracture. No displacement. No dislocation present. Soft tissues are unremarkable. Degenerative change with fusion hardware in the lumbar spine.   - non weight bearing to RLE  - analgesics prn  - npo after midnight  - Orthopedic Surgery consulted and planning for operative repair on 9/8/23    Hx Aortic Stenosis and Aortic Regurgitation - murmur noted on exam.  Most recent echo 12/2020 revealed mild aortic stenosis and aortic regurgitation.  Pt does report occasional EDWARDS.  Most recent cardiac stress test was normal 7/2021.  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  - continue Omeprazole    HTN - hold Lisinopril prior to surgery    Hx Seasonal Allergies and Asthma - no signs of acute flare.    Depression - continue Trazodone and Sertraline    CODE: full  Diet/IVF: regular, npo after midnight  GI ppx:  omeprazole  DVT ppx: SCD    Candace Murray MS PA-C  Physician Assistant   Hospitalist Service           Chief Complaint:   Right hip pain         HPI:   77 year old female with a history of Spinal Stenosis, Duodenal Ulcer, RYGB, HTN, Asthma, Nephrolithiasis, Seasonal Allergies, Aortic Stenosis, Aortic  Regurgitation, SCC of the Parotid Gland, Depression who presents via EMS from [facility] for right femoral head fracture.     Patient reports she was doing yard work yesterday afternoon when she tripped on a hose and fell on her right hip.  She then rolled over on her back.  She denies any head injury or LOC.  She was able to get up and ambulate, but noticed right hip pain.  She had difficulty sleeping due to the pain with no relief from Tylenol and Aleve.  Patient was seen at  where she was found to have a right femoral neck fracture and sent to [facility] ED for orthopedic consultation.           Past Medical History:   Spinal Stenosis, Duodenal Ulcer, RYGB, HTN, Asthma, COPD, Nephrolithiasis, Seasonal Allergies, Aortic Stenosis, Aortic Regurgitation, SCC of the Parotid Gland, Depression       Past Surgical History:     Past Surgical History:   Procedure Laterality Date    ESOPHAGOSCOPY, GASTROSCOPY, DUODENOSCOPY (EGD), COMBINED N/A 5/8/2023    Procedure: Esophagoscopy, gastroscopy, duodenoscopy (EGD), combined with biopsies for h.Pylori;  Surgeon: Emanuel Orourke MD;  Location:  GI          Social History:     Social History     Socioeconomic History    Marital status:      Spouse name: Not on file    Number of children: Not on file    Years of education: Not on file    Highest education level: Not on file   Occupational History    Not on file   Tobacco Use    Smoking status: Not on file    Smokeless tobacco: Not on file   Substance and Sexual Activity    Alcohol use: Not on file    Drug use: Not on file    Sexual activity: Not on file   Other Topics Concern    Not on file   Social History Narrative    Not on file     Social Determinants of Health     Financial Resource Strain: Not on file   Food Insecurity: Not on file   Transportation Needs: Not on file   Physical Activity: Not on file   Stress: Not on file   Social Connections: Not on file   Intimate Partner Violence: Not on file   Housing  "Stability: Not on file          Family History:   No family history on file.       Allergies:      Allergies   Allergen Reactions    Cats Other (See Comments)     Sneezing, runny nose    Pollen Extract Other (See Comments)     Sneezing, runny nose          Medications:     Prior to Admission medications    Medication Sig Last Dose Taking? Auth Provider Long Term End Date   fexofenadine (ALLEGRA) 180 MG tablet Take 180 mg by mouth daily   Unknown, Entered By History     lisinopril (ZESTRIL) 10 MG tablet Take 10 mg by mouth daily   Unknown, Entered By History Yes    omeprazole (PRILOSEC) 20 MG DR capsule Take 1 capsule (20 mg) by mouth 2 times daily For 1 month and then reduce to once daily   Janelle Valverde PA-C     sertraline (ZOLOFT) 100 MG tablet Take 150 mg by mouth daily   Unknown, Entered By History Yes    traZODone (DESYREL) 50 MG tablet Take 50 mg by mouth At Bedtime   Unknown, Entered By History Yes           Review of Systems:   A complete ROS was performed and is negative other than what is stated in the HPI.       Physical Exam:   Blood pressure 139/79, pulse 67, resp. rate 18, height 1.664 m (5' 5.5\"), weight 51.3 kg (113 lb), SpO2 97 %.  General: Alert, interactive, NAD  HEENT: AT/NC  Chest/Resp: clear to auscultation bilaterally, no crackles or wheezes  Heart/CV: regular rate and rhythm, 3/6 systolic murmur  Abdomen/GI: Soft, nontender, nondistended. +BS.  No rebound or guarding.  Extremities/MSK: Pain to palpation of the right lateral/posterior hip.  ROM testing not performed.  Neuro: Alert & oriented x 3         Labs:   ROUTINE ICU LABS (Last four results)  CMPNo lab results found in last 7 days.  CBCNo lab results found in last 7 days.  INRNo lab results found in last 7 days.  Arterial Blood GasNo lab results found in last 7 days.       Imaging/Procedures:   No results found for this or any previous visit.      "

## 2023-09-08 ENCOUNTER — ANESTHESIA EVENT (OUTPATIENT)
Dept: SURGERY | Facility: CLINIC | Age: 78
DRG: 522 | End: 2023-09-08
Payer: COMMERCIAL

## 2023-09-08 ENCOUNTER — ANESTHESIA (OUTPATIENT)
Dept: SURGERY | Facility: CLINIC | Age: 78
DRG: 522 | End: 2023-09-08
Payer: COMMERCIAL

## 2023-09-08 ENCOUNTER — APPOINTMENT (OUTPATIENT)
Dept: GENERAL RADIOLOGY | Facility: CLINIC | Age: 78
DRG: 522 | End: 2023-09-08
Attending: ORTHOPAEDIC SURGERY
Payer: COMMERCIAL

## 2023-09-08 PROBLEM — U07.1 INFECTION DUE TO 2019 NOVEL CORONAVIRUS: Status: RESOLVED | Noted: 2023-05-08 | Resolved: 2023-09-08

## 2023-09-08 LAB
ANION GAP SERPL CALCULATED.3IONS-SCNC: 9 MMOL/L (ref 7–15)
BUN SERPL-MCNC: 16.3 MG/DL (ref 8–23)
CALCIUM SERPL-MCNC: 8.8 MG/DL (ref 8.8–10.2)
CHLORIDE SERPL-SCNC: 107 MMOL/L (ref 98–107)
CREAT SERPL-MCNC: 0.7 MG/DL (ref 0.51–0.95)
CREAT SERPL-MCNC: 0.82 MG/DL (ref 0.51–0.95)
DEPRECATED CALCIDIOL+CALCIFEROL SERPL-MC: 62 UG/L (ref 20–75)
DEPRECATED HCO3 PLAS-SCNC: 25 MMOL/L (ref 22–29)
EGFRCR SERPLBLD CKD-EPI 2021: 73 ML/MIN/1.73M2
EGFRCR SERPLBLD CKD-EPI 2021: 89 ML/MIN/1.73M2
ERYTHROCYTE [DISTWIDTH] IN BLOOD BY AUTOMATED COUNT: 12.3 % (ref 10–15)
GLUCOSE SERPL-MCNC: 101 MG/DL (ref 70–99)
HCT VFR BLD AUTO: 33.8 % (ref 35–47)
HGB BLD-MCNC: 11.1 G/DL (ref 11.7–15.7)
MAGNESIUM SERPL-MCNC: 1.5 MG/DL (ref 1.7–2.3)
MCH RBC QN AUTO: 33.4 PG (ref 26.5–33)
MCHC RBC AUTO-ENTMCNC: 32.8 G/DL (ref 31.5–36.5)
MCV RBC AUTO: 102 FL (ref 78–100)
PLATELET # BLD AUTO: 137 10E3/UL (ref 150–450)
POTASSIUM SERPL-SCNC: 4.3 MMOL/L (ref 3.4–5.3)
RBC # BLD AUTO: 3.32 10E6/UL (ref 3.8–5.2)
SODIUM SERPL-SCNC: 141 MMOL/L (ref 136–145)
WBC # BLD AUTO: 7.1 10E3/UL (ref 4–11)

## 2023-09-08 PROCEDURE — 258N000003 HC RX IP 258 OP 636: Performed by: PHYSICIAN ASSISTANT

## 2023-09-08 PROCEDURE — 250N000025 HC SEVOFLURANE, PER MIN: Performed by: ORTHOPAEDIC SURGERY

## 2023-09-08 PROCEDURE — 250N000013 HC RX MED GY IP 250 OP 250 PS 637: Performed by: PHYSICIAN ASSISTANT

## 2023-09-08 PROCEDURE — 250N000011 HC RX IP 250 OP 636: Performed by: PHYSICIAN ASSISTANT

## 2023-09-08 PROCEDURE — 999N000063 XR PELVIS PORT 1/2 VIEWS: Mod: TC

## 2023-09-08 PROCEDURE — 250N000009 HC RX 250: Performed by: NURSE ANESTHETIST, CERTIFIED REGISTERED

## 2023-09-08 PROCEDURE — 250N000013 HC RX MED GY IP 250 OP 250 PS 637: Performed by: INTERNAL MEDICINE

## 2023-09-08 PROCEDURE — G0463 HOSPITAL OUTPT CLINIC VISIT: HCPCS

## 2023-09-08 PROCEDURE — 83735 ASSAY OF MAGNESIUM: CPT | Performed by: INTERNAL MEDICINE

## 2023-09-08 PROCEDURE — 36415 COLL VENOUS BLD VENIPUNCTURE: CPT | Performed by: INTERNAL MEDICINE

## 2023-09-08 PROCEDURE — 370N000017 HC ANESTHESIA TECHNICAL FEE, PER MIN: Performed by: ORTHOPAEDIC SURGERY

## 2023-09-08 PROCEDURE — 250N000009 HC RX 250: Performed by: ORTHOPAEDIC SURGERY

## 2023-09-08 PROCEDURE — 82565 ASSAY OF CREATININE: CPT | Performed by: PHYSICIAN ASSISTANT

## 2023-09-08 PROCEDURE — 85027 COMPLETE CBC AUTOMATED: CPT | Performed by: INTERNAL MEDICINE

## 2023-09-08 PROCEDURE — 250N000011 HC RX IP 250 OP 636: Mod: JZ | Performed by: PHYSICIAN ASSISTANT

## 2023-09-08 PROCEDURE — 250N000011 HC RX IP 250 OP 636: Mod: JZ | Performed by: INTERNAL MEDICINE

## 2023-09-08 PROCEDURE — 258N000003 HC RX IP 258 OP 636: Performed by: NURSE ANESTHETIST, CERTIFIED REGISTERED

## 2023-09-08 PROCEDURE — 258N000003 HC RX IP 258 OP 636: Performed by: ANESTHESIOLOGY

## 2023-09-08 PROCEDURE — 272N000001 HC OR GENERAL SUPPLY STERILE: Performed by: ORTHOPAEDIC SURGERY

## 2023-09-08 PROCEDURE — 99232 SBSQ HOSP IP/OBS MODERATE 35: CPT | Performed by: INTERNAL MEDICINE

## 2023-09-08 PROCEDURE — C1776 JOINT DEVICE (IMPLANTABLE): HCPCS | Performed by: ORTHOPAEDIC SURGERY

## 2023-09-08 PROCEDURE — 710N000009 HC RECOVERY PHASE 1, LEVEL 1, PER MIN: Performed by: ORTHOPAEDIC SURGERY

## 2023-09-08 PROCEDURE — 88300 SURGICAL PATH GROSS: CPT | Mod: TC | Performed by: ORTHOPAEDIC SURGERY

## 2023-09-08 PROCEDURE — 250N000011 HC RX IP 250 OP 636: Performed by: ANESTHESIOLOGY

## 2023-09-08 PROCEDURE — 999N000141 HC STATISTIC PRE-PROCEDURE NURSING ASSESSMENT: Performed by: ORTHOPAEDIC SURGERY

## 2023-09-08 PROCEDURE — C1713 ANCHOR/SCREW BN/BN,TIS/BN: HCPCS | Performed by: ORTHOPAEDIC SURGERY

## 2023-09-08 PROCEDURE — 258N000001 HC RX 258: Performed by: ORTHOPAEDIC SURGERY

## 2023-09-08 PROCEDURE — 120N000001 HC R&B MED SURG/OB

## 2023-09-08 PROCEDURE — 80048 BASIC METABOLIC PNL TOTAL CA: CPT | Performed by: INTERNAL MEDICINE

## 2023-09-08 PROCEDURE — 250N000009 HC RX 250: Performed by: PHYSICIAN ASSISTANT

## 2023-09-08 PROCEDURE — 88300 SURGICAL PATH GROSS: CPT | Mod: 26 | Performed by: PATHOLOGY

## 2023-09-08 PROCEDURE — 360N000077 HC SURGERY LEVEL 4, PER MIN: Performed by: ORTHOPAEDIC SURGERY

## 2023-09-08 PROCEDURE — 36415 COLL VENOUS BLD VENIPUNCTURE: CPT | Performed by: PHYSICIAN ASSISTANT

## 2023-09-08 PROCEDURE — 0SR902A REPLACEMENT OF RIGHT HIP JOINT WITH METAL ON POLYETHYLENE SYNTHETIC SUBSTITUTE, UNCEMENTED, OPEN APPROACH: ICD-10-PCS | Performed by: ORTHOPAEDIC SURGERY

## 2023-09-08 PROCEDURE — 250N000011 HC RX IP 250 OP 636: Performed by: NURSE ANESTHETIST, CERTIFIED REGISTERED

## 2023-09-08 DEVICE — IMPLANTABLE DEVICE: Type: IMPLANTABLE DEVICE | Site: HIP | Status: FUNCTIONAL

## 2023-09-08 DEVICE — IMP HEAD FEMORAL DEPUY 32MM +1 1365-21-000: Type: IMPLANTABLE DEVICE | Site: HIP | Status: FUNCTIONAL

## 2023-09-08 DEVICE — IMP LINER HIP DEPUY PINNACLE ALTRX 32X48MM +4 1221-32-448: Type: IMPLANTABLE DEVICE | Site: HIP | Status: FUNCTIONAL

## 2023-09-08 DEVICE — IMP CUP ACE PINNACLE 48MM 1217-22-048: Type: IMPLANTABLE DEVICE | Site: HIP | Status: FUNCTIONAL

## 2023-09-08 DEVICE — IMP SCR DEPUY PINNACLE CANC 6.5X15MM 1217-15-500: Type: IMPLANTABLE DEVICE | Site: HIP | Status: FUNCTIONAL

## 2023-09-08 DEVICE — IMP APEX HOLE ELIMINATOR HIP DEPUY DURALOC 1246-03-000: Type: IMPLANTABLE DEVICE | Site: HIP | Status: FUNCTIONAL

## 2023-09-08 DEVICE — IMP SCR BONE CAN ACE 6.5X25MM 1217-25-500: Type: IMPLANTABLE DEVICE | Site: HIP | Status: FUNCTIONAL

## 2023-09-08 RX ORDER — HYDROXYZINE HYDROCHLORIDE 10 MG/1
10 TABLET, FILM COATED ORAL EVERY 6 HOURS PRN
Status: DISCONTINUED | OUTPATIENT
Start: 2023-09-08 | End: 2023-09-10 | Stop reason: HOSPADM

## 2023-09-08 RX ORDER — ONDANSETRON 2 MG/ML
4 INJECTION INTRAMUSCULAR; INTRAVENOUS EVERY 6 HOURS PRN
Status: DISCONTINUED | OUTPATIENT
Start: 2023-09-08 | End: 2023-09-10 | Stop reason: HOSPADM

## 2023-09-08 RX ORDER — PROPOFOL 10 MG/ML
INJECTION, EMULSION INTRAVENOUS CONTINUOUS PRN
Status: DISCONTINUED | OUTPATIENT
Start: 2023-09-08 | End: 2023-09-08

## 2023-09-08 RX ORDER — TRANEXAMIC ACID 10 MG/ML
1 INJECTION, SOLUTION INTRAVENOUS ONCE
Status: COMPLETED | OUTPATIENT
Start: 2023-09-08 | End: 2023-09-08

## 2023-09-08 RX ORDER — POLYETHYLENE GLYCOL 3350 17 G/17G
17 POWDER, FOR SOLUTION ORAL DAILY
Qty: 510 G | Refills: 0 | Status: SHIPPED | OUTPATIENT
Start: 2023-09-08

## 2023-09-08 RX ORDER — AMOXICILLIN 250 MG
1 CAPSULE ORAL 2 TIMES DAILY PRN
Qty: 21 TABLET | Refills: 0 | Status: SHIPPED | OUTPATIENT
Start: 2023-09-08

## 2023-09-08 RX ORDER — ENOXAPARIN SODIUM 100 MG/ML
30 INJECTION SUBCUTANEOUS EVERY 24 HOURS
Qty: 12.6 ML | Refills: 0 | Status: SHIPPED | OUTPATIENT
Start: 2023-09-09 | End: 2023-10-21

## 2023-09-08 RX ORDER — OXYCODONE HYDROCHLORIDE 5 MG/1
5 TABLET ORAL
Status: DISCONTINUED | OUTPATIENT
Start: 2023-09-08 | End: 2023-09-08 | Stop reason: HOSPADM

## 2023-09-08 RX ORDER — NALOXONE HYDROCHLORIDE 0.4 MG/ML
0.2 INJECTION, SOLUTION INTRAMUSCULAR; INTRAVENOUS; SUBCUTANEOUS
Status: DISCONTINUED | OUTPATIENT
Start: 2023-09-08 | End: 2023-09-10 | Stop reason: HOSPADM

## 2023-09-08 RX ORDER — CEFAZOLIN SODIUM 1 G/3ML
1 INJECTION, POWDER, FOR SOLUTION INTRAMUSCULAR; INTRAVENOUS EVERY 8 HOURS
Status: COMPLETED | OUTPATIENT
Start: 2023-09-08 | End: 2023-09-09

## 2023-09-08 RX ORDER — FENTANYL CITRATE 50 UG/ML
25 INJECTION, SOLUTION INTRAMUSCULAR; INTRAVENOUS ONCE
Status: COMPLETED | OUTPATIENT
Start: 2023-09-08 | End: 2023-09-08

## 2023-09-08 RX ORDER — OXYCODONE HYDROCHLORIDE 10 MG/1
10 TABLET ORAL EVERY 4 HOURS PRN
Status: DISCONTINUED | OUTPATIENT
Start: 2023-09-08 | End: 2023-09-10 | Stop reason: HOSPADM

## 2023-09-08 RX ORDER — PROPOFOL 10 MG/ML
INJECTION, EMULSION INTRAVENOUS PRN
Status: DISCONTINUED | OUTPATIENT
Start: 2023-09-08 | End: 2023-09-08

## 2023-09-08 RX ORDER — FENTANYL CITRATE 50 UG/ML
INJECTION, SOLUTION INTRAMUSCULAR; INTRAVENOUS PRN
Status: DISCONTINUED | OUTPATIENT
Start: 2023-09-08 | End: 2023-09-08

## 2023-09-08 RX ORDER — HYDROMORPHONE HCL IN WATER/PF 6 MG/30 ML
0.2 PATIENT CONTROLLED ANALGESIA SYRINGE INTRAVENOUS
Status: DISCONTINUED | OUTPATIENT
Start: 2023-09-08 | End: 2023-09-10 | Stop reason: HOSPADM

## 2023-09-08 RX ORDER — CEFAZOLIN SODIUM/WATER 2 G/20 ML
2 SYRINGE (ML) INTRAVENOUS
Status: COMPLETED | OUTPATIENT
Start: 2023-09-08 | End: 2023-09-08

## 2023-09-08 RX ORDER — CEFAZOLIN SODIUM/WATER 2 G/20 ML
2 SYRINGE (ML) INTRAVENOUS SEE ADMIN INSTRUCTIONS
Status: DISCONTINUED | OUTPATIENT
Start: 2023-09-08 | End: 2023-09-08 | Stop reason: HOSPADM

## 2023-09-08 RX ORDER — NALOXONE HYDROCHLORIDE 0.4 MG/ML
0.4 INJECTION, SOLUTION INTRAMUSCULAR; INTRAVENOUS; SUBCUTANEOUS
Status: DISCONTINUED | OUTPATIENT
Start: 2023-09-08 | End: 2023-09-10 | Stop reason: HOSPADM

## 2023-09-08 RX ORDER — ONDANSETRON 4 MG/1
4 TABLET, ORALLY DISINTEGRATING ORAL EVERY 30 MIN PRN
Status: DISCONTINUED | OUTPATIENT
Start: 2023-09-08 | End: 2023-09-08 | Stop reason: HOSPADM

## 2023-09-08 RX ORDER — ENOXAPARIN SODIUM 100 MG/ML
40 INJECTION SUBCUTANEOUS EVERY 24 HOURS
Status: DISCONTINUED | OUTPATIENT
Start: 2023-09-09 | End: 2023-09-08

## 2023-09-08 RX ORDER — LIDOCAINE 40 MG/G
CREAM TOPICAL
Status: DISCONTINUED | OUTPATIENT
Start: 2023-09-08 | End: 2023-09-10 | Stop reason: HOSPADM

## 2023-09-08 RX ORDER — ENOXAPARIN SODIUM 100 MG/ML
30 INJECTION SUBCUTANEOUS EVERY 24 HOURS
Status: DISCONTINUED | OUTPATIENT
Start: 2023-09-09 | End: 2023-09-10 | Stop reason: HOSPADM

## 2023-09-08 RX ORDER — OXYCODONE HYDROCHLORIDE 5 MG/1
10 TABLET ORAL
Status: DISCONTINUED | OUTPATIENT
Start: 2023-09-08 | End: 2023-09-08 | Stop reason: HOSPADM

## 2023-09-08 RX ORDER — POLYETHYLENE GLYCOL 3350 17 G/17G
17 POWDER, FOR SOLUTION ORAL DAILY
Status: DISCONTINUED | OUTPATIENT
Start: 2023-09-09 | End: 2023-09-10 | Stop reason: HOSPADM

## 2023-09-08 RX ORDER — ACETAMINOPHEN 325 MG/1
650 TABLET ORAL EVERY 4 HOURS PRN
Status: DISCONTINUED | OUTPATIENT
Start: 2023-09-11 | End: 2023-09-10 | Stop reason: HOSPADM

## 2023-09-08 RX ORDER — ONDANSETRON 2 MG/ML
4 INJECTION INTRAMUSCULAR; INTRAVENOUS EVERY 30 MIN PRN
Status: DISCONTINUED | OUTPATIENT
Start: 2023-09-08 | End: 2023-09-08 | Stop reason: HOSPADM

## 2023-09-08 RX ORDER — GLYCOPYRROLATE 0.2 MG/ML
INJECTION, SOLUTION INTRAMUSCULAR; INTRAVENOUS PRN
Status: DISCONTINUED | OUTPATIENT
Start: 2023-09-08 | End: 2023-09-08

## 2023-09-08 RX ORDER — SODIUM CHLORIDE, SODIUM LACTATE, POTASSIUM CHLORIDE, CALCIUM CHLORIDE 600; 310; 30; 20 MG/100ML; MG/100ML; MG/100ML; MG/100ML
INJECTION, SOLUTION INTRAVENOUS CONTINUOUS
Status: DISCONTINUED | OUTPATIENT
Start: 2023-09-08 | End: 2023-09-08 | Stop reason: HOSPADM

## 2023-09-08 RX ORDER — PROCHLORPERAZINE MALEATE 5 MG
5 TABLET ORAL EVERY 6 HOURS PRN
Status: DISCONTINUED | OUTPATIENT
Start: 2023-09-08 | End: 2023-09-10 | Stop reason: HOSPADM

## 2023-09-08 RX ORDER — OXYCODONE HYDROCHLORIDE 5 MG/1
5 TABLET ORAL EVERY 4 HOURS PRN
Qty: 25 TABLET | Refills: 0 | Status: SHIPPED | OUTPATIENT
Start: 2023-09-08

## 2023-09-08 RX ORDER — NEOSTIGMINE METHYLSULFATE 1 MG/ML
VIAL (ML) INJECTION PRN
Status: DISCONTINUED | OUTPATIENT
Start: 2023-09-08 | End: 2023-09-08

## 2023-09-08 RX ORDER — FENTANYL CITRATE 50 UG/ML
25 INJECTION, SOLUTION INTRAMUSCULAR; INTRAVENOUS EVERY 5 MIN PRN
Status: DISCONTINUED | OUTPATIENT
Start: 2023-09-08 | End: 2023-09-08 | Stop reason: HOSPADM

## 2023-09-08 RX ORDER — SODIUM CHLORIDE, SODIUM LACTATE, POTASSIUM CHLORIDE, CALCIUM CHLORIDE 600; 310; 30; 20 MG/100ML; MG/100ML; MG/100ML; MG/100ML
INJECTION, SOLUTION INTRAVENOUS CONTINUOUS
Status: DISCONTINUED | OUTPATIENT
Start: 2023-09-08 | End: 2023-09-10 | Stop reason: HOSPADM

## 2023-09-08 RX ORDER — FENTANYL CITRATE 50 UG/ML
50 INJECTION, SOLUTION INTRAMUSCULAR; INTRAVENOUS EVERY 5 MIN PRN
Status: DISCONTINUED | OUTPATIENT
Start: 2023-09-08 | End: 2023-09-08 | Stop reason: HOSPADM

## 2023-09-08 RX ORDER — HYDROMORPHONE HCL IN WATER/PF 6 MG/30 ML
0.2 PATIENT CONTROLLED ANALGESIA SYRINGE INTRAVENOUS EVERY 5 MIN PRN
Status: DISCONTINUED | OUTPATIENT
Start: 2023-09-08 | End: 2023-09-08 | Stop reason: HOSPADM

## 2023-09-08 RX ORDER — LIDOCAINE HYDROCHLORIDE 20 MG/ML
INJECTION, SOLUTION INFILTRATION; PERINEURAL PRN
Status: DISCONTINUED | OUTPATIENT
Start: 2023-09-08 | End: 2023-09-08

## 2023-09-08 RX ORDER — BISACODYL 10 MG
10 SUPPOSITORY, RECTAL RECTAL DAILY PRN
Status: DISCONTINUED | OUTPATIENT
Start: 2023-09-08 | End: 2023-09-10 | Stop reason: HOSPADM

## 2023-09-08 RX ORDER — HYDROMORPHONE HCL IN WATER/PF 6 MG/30 ML
0.4 PATIENT CONTROLLED ANALGESIA SYRINGE INTRAVENOUS EVERY 5 MIN PRN
Status: DISCONTINUED | OUTPATIENT
Start: 2023-09-08 | End: 2023-09-08 | Stop reason: HOSPADM

## 2023-09-08 RX ORDER — KETOROLAC TROMETHAMINE 15 MG/ML
15 INJECTION, SOLUTION INTRAMUSCULAR; INTRAVENOUS
Status: DISCONTINUED | OUTPATIENT
Start: 2023-09-08 | End: 2023-09-08 | Stop reason: HOSPADM

## 2023-09-08 RX ORDER — HYDROXYZINE HYDROCHLORIDE 10 MG/1
10 TABLET, FILM COATED ORAL EVERY 6 HOURS PRN
Qty: 20 TABLET | Refills: 0 | Status: SHIPPED | OUTPATIENT
Start: 2023-09-08

## 2023-09-08 RX ORDER — LIDOCAINE 40 MG/G
CREAM TOPICAL
Status: DISCONTINUED | OUTPATIENT
Start: 2023-09-08 | End: 2023-09-08 | Stop reason: HOSPADM

## 2023-09-08 RX ORDER — OXYCODONE HYDROCHLORIDE 5 MG/1
5 TABLET ORAL EVERY 4 HOURS PRN
Status: DISCONTINUED | OUTPATIENT
Start: 2023-09-08 | End: 2023-09-10 | Stop reason: HOSPADM

## 2023-09-08 RX ORDER — HYDROMORPHONE HCL IN WATER/PF 6 MG/30 ML
0.4 PATIENT CONTROLLED ANALGESIA SYRINGE INTRAVENOUS
Status: DISCONTINUED | OUTPATIENT
Start: 2023-09-08 | End: 2023-09-10 | Stop reason: HOSPADM

## 2023-09-08 RX ORDER — ACETAMINOPHEN 325 MG/1
975 TABLET ORAL EVERY 8 HOURS PRN
Qty: 60 TABLET | Refills: 0 | Status: SHIPPED | OUTPATIENT
Start: 2023-09-08

## 2023-09-08 RX ORDER — ACETAMINOPHEN 325 MG/1
975 TABLET ORAL EVERY 8 HOURS
Status: DISCONTINUED | OUTPATIENT
Start: 2023-09-08 | End: 2023-09-10 | Stop reason: HOSPADM

## 2023-09-08 RX ORDER — ACETAMINOPHEN 325 MG/1
975 TABLET ORAL ONCE
Status: DISCONTINUED | OUTPATIENT
Start: 2023-09-08 | End: 2023-09-08 | Stop reason: HOSPADM

## 2023-09-08 RX ORDER — MAGNESIUM SULFATE HEPTAHYDRATE 40 MG/ML
4 INJECTION, SOLUTION INTRAVENOUS ONCE
Status: COMPLETED | OUTPATIENT
Start: 2023-09-08 | End: 2023-09-08

## 2023-09-08 RX ORDER — ONDANSETRON 4 MG/1
4 TABLET, ORALLY DISINTEGRATING ORAL EVERY 6 HOURS PRN
Status: DISCONTINUED | OUTPATIENT
Start: 2023-09-08 | End: 2023-09-10 | Stop reason: HOSPADM

## 2023-09-08 RX ORDER — VITAMIN B COMPLEX
50 TABLET ORAL DAILY
Qty: 60 TABLET | Refills: 0 | Status: SHIPPED | OUTPATIENT
Start: 2023-09-08 | End: 2023-10-08

## 2023-09-08 RX ORDER — AMOXICILLIN 250 MG
1 CAPSULE ORAL 2 TIMES DAILY
Status: DISCONTINUED | OUTPATIENT
Start: 2023-09-08 | End: 2023-09-10 | Stop reason: HOSPADM

## 2023-09-08 RX ADMIN — CEFAZOLIN 1 G: 1 INJECTION, POWDER, FOR SOLUTION INTRAMUSCULAR; INTRAVENOUS at 16:43

## 2023-09-08 RX ADMIN — PANTOPRAZOLE SODIUM 40 MG: 40 TABLET, DELAYED RELEASE ORAL at 19:11

## 2023-09-08 RX ADMIN — OXYCODONE HYDROCHLORIDE 5 MG: 5 TABLET ORAL at 19:11

## 2023-09-08 RX ADMIN — PHENYLEPHRINE HYDROCHLORIDE 100 MCG: 10 INJECTION INTRAVENOUS at 11:16

## 2023-09-08 RX ADMIN — LIDOCAINE HYDROCHLORIDE 40 MG: 20 INJECTION, SOLUTION INFILTRATION; PERINEURAL at 09:39

## 2023-09-08 RX ADMIN — NEOSTIGMINE METHYLSULFATE 2 MG: 1 INJECTION, SOLUTION INTRAVENOUS at 11:36

## 2023-09-08 RX ADMIN — ACETAMINOPHEN 975 MG: 325 TABLET, FILM COATED ORAL at 13:37

## 2023-09-08 RX ADMIN — Medication 2 G: at 09:29

## 2023-09-08 RX ADMIN — PROPOFOL 50 MCG/KG/MIN: 10 INJECTION, EMULSION INTRAVENOUS at 10:02

## 2023-09-08 RX ADMIN — FENTANYL CITRATE 25 MCG: 50 INJECTION, SOLUTION INTRAMUSCULAR; INTRAVENOUS at 08:21

## 2023-09-08 RX ADMIN — METHOCARBAMOL 500 MG: 500 TABLET ORAL at 19:15

## 2023-09-08 RX ADMIN — GLYCOPYRROLATE 0.4 MG: 0.2 INJECTION, SOLUTION INTRAMUSCULAR; INTRAVENOUS at 11:35

## 2023-09-08 RX ADMIN — HYDROMORPHONE HYDROCHLORIDE 0.5 MG: 1 INJECTION, SOLUTION INTRAMUSCULAR; INTRAVENOUS; SUBCUTANEOUS at 10:01

## 2023-09-08 RX ADMIN — PHENYLEPHRINE HYDROCHLORIDE 100 MCG: 10 INJECTION INTRAVENOUS at 11:03

## 2023-09-08 RX ADMIN — HYDROMORPHONE HYDROCHLORIDE 0.5 MG: 1 INJECTION, SOLUTION INTRAMUSCULAR; INTRAVENOUS; SUBCUTANEOUS at 10:20

## 2023-09-08 RX ADMIN — ACETAMINOPHEN 975 MG: 325 TABLET, FILM COATED ORAL at 21:43

## 2023-09-08 RX ADMIN — OXYCODONE HYDROCHLORIDE 10 MG: 10 TABLET ORAL at 23:07

## 2023-09-08 RX ADMIN — HYDROXYZINE HYDROCHLORIDE 10 MG: 10 TABLET ORAL at 22:41

## 2023-09-08 RX ADMIN — SODIUM CHLORIDE, POTASSIUM CHLORIDE, SODIUM LACTATE AND CALCIUM CHLORIDE: 600; 310; 30; 20 INJECTION, SOLUTION INTRAVENOUS at 12:47

## 2023-09-08 RX ADMIN — PHENYLEPHRINE HYDROCHLORIDE 50 MCG: 10 INJECTION INTRAVENOUS at 11:31

## 2023-09-08 RX ADMIN — ROCURONIUM BROMIDE 10 MG: 50 INJECTION, SOLUTION INTRAVENOUS at 10:27

## 2023-09-08 RX ADMIN — ROCURONIUM BROMIDE 40 MG: 50 INJECTION, SOLUTION INTRAVENOUS at 09:40

## 2023-09-08 RX ADMIN — ROCURONIUM BROMIDE 10 MG: 50 INJECTION, SOLUTION INTRAVENOUS at 10:24

## 2023-09-08 RX ADMIN — PROPOFOL 110 MG: 10 INJECTION, EMULSION INTRAVENOUS at 09:39

## 2023-09-08 RX ADMIN — TRANEXAMIC ACID 1 G: 10 INJECTION, SOLUTION INTRAVENOUS at 09:47

## 2023-09-08 RX ADMIN — MAGNESIUM SULFATE HEPTAHYDRATE 4 G: 4 INJECTION, SOLUTION INTRAVENOUS at 20:00

## 2023-09-08 RX ADMIN — ONDANSETRON 4 MG: 2 INJECTION INTRAMUSCULAR; INTRAVENOUS at 11:23

## 2023-09-08 RX ADMIN — SODIUM CHLORIDE, POTASSIUM CHLORIDE, SODIUM LACTATE AND CALCIUM CHLORIDE: 600; 310; 30; 20 INJECTION, SOLUTION INTRAVENOUS at 09:00

## 2023-09-08 RX ADMIN — HYDROMORPHONE HYDROCHLORIDE 0.4 MG: 0.2 INJECTION, SOLUTION INTRAMUSCULAR; INTRAVENOUS; SUBCUTANEOUS at 03:13

## 2023-09-08 RX ADMIN — TRAZODONE HYDROCHLORIDE 50 MG: 50 TABLET ORAL at 21:44

## 2023-09-08 RX ADMIN — PROPOFOL 20 MG: 10 INJECTION, EMULSION INTRAVENOUS at 10:28

## 2023-09-08 RX ADMIN — ROCURONIUM BROMIDE 10 MG: 50 INJECTION, SOLUTION INTRAVENOUS at 10:12

## 2023-09-08 RX ADMIN — FENTANYL CITRATE 100 MCG: 50 INJECTION, SOLUTION INTRAMUSCULAR; INTRAVENOUS at 09:39

## 2023-09-08 RX ADMIN — SODIUM CHLORIDE, POTASSIUM CHLORIDE, SODIUM LACTATE AND CALCIUM CHLORIDE: 600; 310; 30; 20 INJECTION, SOLUTION INTRAVENOUS at 13:18

## 2023-09-08 RX ADMIN — SENNOSIDES AND DOCUSATE SODIUM 1 TABLET: 50; 8.6 TABLET ORAL at 19:11

## 2023-09-08 RX ADMIN — TRANEXAMIC ACID 1 G: 10 INJECTION, SOLUTION INTRAVENOUS at 11:34

## 2023-09-08 RX ADMIN — ROCURONIUM BROMIDE 10 MG: 50 INJECTION, SOLUTION INTRAVENOUS at 10:17

## 2023-09-08 RX ADMIN — HYDROMORPHONE HYDROCHLORIDE 0.4 MG: 0.2 INJECTION, SOLUTION INTRAMUSCULAR; INTRAVENOUS; SUBCUTANEOUS at 06:34

## 2023-09-08 ASSESSMENT — ACTIVITIES OF DAILY LIVING (ADL)
ADLS_ACUITY_SCORE: 46
ADLS_ACUITY_SCORE: 49
ADLS_ACUITY_SCORE: 49
ADLS_ACUITY_SCORE: 46
ADLS_ACUITY_SCORE: 45
ADLS_ACUITY_SCORE: 45
ADLS_ACUITY_SCORE: 49
ADLS_ACUITY_SCORE: 49
ADLS_ACUITY_SCORE: 46
ADLS_ACUITY_SCORE: 46

## 2023-09-08 NOTE — ANESTHESIA POSTPROCEDURE EVALUATION
Patient: Ines Harry    Procedure: Procedure(s):  Right total hip arthroplasty       Anesthesia Type:  General    Note:  Disposition: Outpatient   Postop Pain Control: Uneventful            Sign Out: Well controlled pain   PONV: No   Neuro/Psych: Uneventful            Sign Out: Acceptable/Baseline neuro status   Airway/Respiratory: Uneventful            Sign Out: Acceptable/Baseline resp. status   CV/Hemodynamics: Uneventful            Sign Out: Acceptable CV status; No obvious hypovolemia; No obvious fluid overload   Other NRE: NONE   DID A NON-ROUTINE EVENT OCCUR? No           Last vitals:  Vitals Value Taken Time   /54 09/08/23 1250   Temp 97.6  F (36.4  C) 09/08/23 1230   Pulse 71 09/08/23 1256   Resp 18 09/08/23 1256   SpO2 100 % 09/08/23 1257   Vitals shown include unvalidated device data.    Electronically Signed By: Kami Neal MD  September 8, 2023  1:19 PM

## 2023-09-08 NOTE — PROGRESS NOTES
[facility]    Medicine Progress Note - Hospitalist Service    Date of Admission:  9/7/2023    Assessment & Plan   77 year old female with a history of Spinal Stenosis, Duodenal Ulcer, RYGB, HTN, Asthma, Nephrolithiasis, Seasonal Allergies, Aortic Stenosis, Aortic Regurgitation, SCC of the Parotid Gland, Depression who presents via EMS from [facility] for right femoral head fracture.      Right Femoral Neck Fracture s/p surgical repair 9/8/23 by Dr. Almaguer:  -  Post-op site cares, activity restrictions, pain medications, DVT proph per orthopedics.    Urinary Retention:  -  Noted before surgery with mckinney placed, likely related to initial pain/poor mobility.  Will leave mckinney cath in overnight until more mobile tomorrow.  Will likely try voiding trial in AM.    Hx Aortic Stenosis and Aortic Regurgitation with severe stenosis on updated echocardiogram:  -  Negative stress test 2021.  No recent chest pain, increased SOB.  She has been functionally active.   - murmur noted on exam.  Most recent echo 12/2020 revealed mild aortic stenosis and aortic regurgitation.  Pt does report occasional EDWARDS but stable lately.  Most recent cardiac stress test was normal 7/2021.  New echocardiogram suggested valve stenosis has worsened significantly.  EF normal range.  Will need to be cautious with meds/fluids post-op.  Will ask cardiology to see tomorrow given notable progression since 2020.     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  - continue Omeprazole     HTN:  -  Hold Lisinopril, BP still low normal range.     Hx Seasonal Allergies and Asthma - no signs of acute flare.     Depression - continue Trazodone and Sertraline    Medical Decision Making       45 MINUTES SPENT BY ME on the date of service doing chart review, history, exam, documentation & further activities per the note.      Labs/Imaging Reviewed:  See Information above  and Data section below    PPE Used:  Mask       Diet: Advance Diet as Tolerated: Regular Diet Adult    DVT Prophylaxis: Defer to Orthopedics  Qureshi Catheter: PRESENT, indication: Anesthesia  Lines: None     Cardiac Monitoring: None  Code Status: Full Code      Clinically Significant Risk Factors Present on Admission                  # Hypertension: Home medication list includes antihypertensive(s)   # Circulatory Shock: currently requiring pressors for blood pressure support                Disposition Plan     Expected Discharge Date: 09/09/2023                  Stephen Robertson DO  Hospitalist Service    Securely message with [employer] (more info)  Text page via [employer] Paging/Directory   ______________________________________________________________________    Interval History   Feeling well post-op, denied any significant pain yet.  No SOB or nausea.  No other major new complaints.    Physical Exam   Vital Signs: Temp: 97.3  F (36.3  C) Temp src: Tympanic BP: (!) 100/37 Pulse: 67   Resp: 10 SpO2: 100 % O2 Device: Nasal cannula Oxygen Delivery: 2 LPM  Weight: 118 lbs 6.19 oz      GEN:  Alert, oriented x 3, appears comfortable, no overt distress.  HEENT:  Normocephalic/atraumatic, no scleral icterus, no nasal discharge, mouth moist.  CV:  Regular rate and rhythm, soft murmur, no rub.  LUNGS:  Clear to auscultation bilaterally without rales/rhonchi/wheezing/retractions.  Symmetric chest rise on inhalation noted.  ABD:  Active bowel sounds, soft, non-tender/non-distended.  No guarding/rigidity.  EXT:  Trace LE edema.  No cyanosis.  Detailed surgical site exam deferred to orthopedics.  Moving feet well.  SKIN:  Dry to touch, no exanthems noted in the visualized areas.    Medications    lactated ringers 75 mL/hr at 09/08/23 1318      acetaminophen  975 mg Oral Q8H    ceFAZolin  1 g Intravenous Q8H    [START ON 9/9/2023] enoxaparin ANTICOAGULANT  40 mg Subcutaneous Q24H    pantoprazole  40 mg  Oral BID    [START ON 2023] polyethylene glycol  17 g Oral Daily    senna-docusate  1 tablet Oral BID    sertraline  150 mg Oral Daily    sodium chloride (PF)  3 mL Intracatheter Q8H    sodium chloride (PF)  3 mL Intracatheter Q8H    traZODone  50 mg Oral At Bedtime    cholecalciferol  50 mcg Oral Daily       Data     Labs and Imaging results below reviewed today.    I have personally reviewed the following data over the past 24 hrs:    7.1  \   11.1 (L)   / 137 (L)     141 107 16.3 /  101 (H)   4.3 25 0.82 \           Recent Results (from the past 24 hour(s))   Echo Limited   Result Value    LVEF  55-60%    Narrative    [id number]  [id number]  [id number]  [id number]^GENOVEVA^WENDY^S     [facility]  Echocardiography Laboratory  [address] [place] [address]     Name: KEISHA MCNAMARA  MRN: [id number]  : 1945  Study Date: 2023 03:31 PM  Age: 77 yrs  Gender: Female  Patient Location: [facility]  Reason For Study: Aortic Valve Disorder  Ordering Physician: WENDY TOMAS  Performed By: Marsha Guevara     BSA: 1.6 m2  Height: 65 in  Weight: 113 lb  HR: 72  BP: 132/72 mmHg  ______________________________________________________________________________  Procedure  Limited Portable Echo Adult. (Emergent exam, abbreviated study performed).  Optison (NDC #8267-4706) given intravenously.  ______________________________________________________________________________  Interpretation Summary     The visual ejection fraction is 55-60%.  There is mild to moderate (1-2+) tricuspid regurgitation.  Right ventricular systolic pressure is elevated, consistent with mild to  moderate pulmonary hypertension.  Severe valvular aortic stenosis.  The calculated aortic valve area is 0,7cm2.  Contrast was used without apparent complications. There is no comparison study  available.  ______________________________________________________________________________  Left Ventricle  The left ventricle is normal in  structure, function and size. The visual  ejection fraction is 55-60%. Grade I or early diastolic dysfunction.     Right Ventricle  The right ventricle is normal in structure, function and size.     Atria  Normal left atrial size. Right atrial size is normal.     Mitral Valve  The mitral valve leaflets appear thickened, but open well. There is trace  mitral regurgitation.     Tricuspid Valve  Normal tricuspid valve. There is mild to moderate (1-2+) tricuspid  regurgitation. Right ventricular systolic pressure is elevated, consistent  with mild to moderate pulmonary hypertension.     Aortic Valve  Severe valvular aortic stenosis. The calculated aortic valve area is 0,7cm2.     Pulmonic Valve  Normal pulmonic valve.     Vessels  The aortic root is normal size. Normal size ascending aorta. The inferior vena  cava is normal.     Pericardium  There is no pericardial effusion.     Rhythm  Sinus rhythm was noted.  ______________________________________________________________________________  MMode/2D Measurements & Calculations     IVSd: 0.90 cm  LVIDd: 3.9 cm  LVIDs: 2.4 cm  LVPWd: 1.00 cm  IVC diam: 1.5 cm  FS: 40.0 %  LV mass(C)d: 114.4 grams  LV mass(C)dI: 73.7 grams/m2  Ao root diam: 2.8 cm  asc Aorta Diam: 3.6 cm  LVOT diam: 2.0 cm  LVOT area: 3.0 cm2  Ao root diam Index (cm/m2): 1.8  asc Aorta Diam Index (cm/m2): 2.3  RWT: 0.51     Doppler Measurements & Calculations  MV max P.4 mmHg  MV mean P.7 mmHg  MV V2 VTI: 45.1 cm  MVA(VTI): 1.4 cm2  MV P1/2t max mauricio: 148.9 cm/sec  MV P1/2t: 95.5 msec  MVA(P1/2t): 2.3 cm2  MV dec slope: 456.6 cm/sec2  Ao V2 max: 363.6 cm/sec  Ao max P.9 mmHg  Ao V2 mean: 283.4 cm/sec  Ao mean P.3 mmHg  Ao V2 VTI: 94.8 cm  GEOVANNA(I,D): 0.65 cm2  GEOVANNA(V,D): 0.64 cm2  LV V1 max P.4 mmHg  LV V1 max: 77.2 cm/sec  LV V1 VTI: 20.3 cm     SV(LVOT): 61.6 ml  SI(LVOT): 39.7 ml/m2  TR max mauricio: 300.9 cm/sec  TR max P.2 mmHg  AV Mauricio Ratio (DI): 0.21  GEOVANNA Index (cm2/m2): 0.42      ______________________________________________________________________________  Report approved by: Gus Corona 09/07/2023 04:22 PM         XR Pelvis Port 1/2 Views    Narrative    This exam was marked as non-reportable because it will not be read by a   radiologist or a [facility] non-radiologist provider.

## 2023-09-08 NOTE — PROGRESS NOTES
Arrived to room 606 from ER at 1900 via cart. Pt is alert and oriented x 4. Oriented to room and call system. Saline locked. Due to void. On 2L of O2 via NC. Pain managed with scheduled Tylenol, PRN Oxycodone, IV dilaudid and Robaxin. Due to void. CMS intact. NPO @ midnight for surgical intervention today. Admission profile started.      Pt unable to void. Received order to place mckinney catheter.

## 2023-09-08 NOTE — PROGRESS NOTES
Notified provider about indwelling mckinney catheter discussed removal or continued need.    Did provider choose to remove indwelling mckinney catheter? NO    Provider's mckinney indication for keeping indwelling mckinney catheter: Indication for continued use: Retention    Is there an order for indwelling mckinney catheter? YES    *If there is a plan to keep mckinney catheter in place at discharge daily notification with provider is not necessary, but please add a notation in the treatment team sticky note that the patient will be discharging with the catheter.

## 2023-09-08 NOTE — PLAN OF CARE
Vitals: VSS x baseline soft pressures  O2: On Capno, 100% PO2 on 2L  Orientation: Aox4.   Pain: Denies pain  Lines \ Drains: PIV in R arm, infusing LR 75ml.  Lungs: Clear bilat anterior, shallow.  GI/: Bowel sounds audible.   Skin: Incision on R total hip anthroplasty, Unable to visualize, skin around site WDL.  Considerations: Pt arrived back from PACU at approx 1:05pm.  VSS on return assessments per order.  Plan: Pt/OT/Social Work following, pain management, ortho following. K and Mag protocol placed.    Sergio SALGADO

## 2023-09-08 NOTE — ANESTHESIA CARE TRANSFER NOTE
Patient: Ines Harry    Procedure: Procedure(s):  Right total hip arthroplasty       Diagnosis: Closed fracture of neck of right femur, initial encounter (H) [S72.001A]  Diagnosis Additional Information: No value filed.    Anesthesia Type:   General     Note:    Oropharynx: oropharynx clear of all foreign objects and spontaneously breathing  Level of Consciousness: drowsy  Oxygen Supplementation: face mask  Level of Supplemental Oxygen (L/min / FiO2): 8  Independent Airway: airway patency satisfactory and stable  Dentition: dentition unchanged  Vital Signs Stable: post-procedure vital signs reviewed and stable  Report to RN Given: handoff report given  Patient transferred to: PACU    Handoff Report: Identifed the Patient, Identified the Reponsible Provider, Reviewed the pertinent medical history, Discussed the surgical course, Reviewed Intra-OP anesthesia mangement and issues during anesthesia, Set expectations for post-procedure period and Allowed opportunity for questions and acknowledgement of understanding      Vitals:  Vitals Value Taken Time   /54 09/08/23 1155   Temp     Pulse 90 09/08/23 1157   Resp 13 09/08/23 1157   SpO2 100 % 09/08/23 1157   Vitals shown include unvalidated device data.    Electronically Signed By: RENE Dubose CRNA  September 8, 2023  11:58 AM

## 2023-09-08 NOTE — ANESTHESIA PREPROCEDURE EVALUATION
Anesthesia Pre-Procedure Evaluation    Patient: Ines Harry   MRN: [id number] : 1945        Procedure : Procedure(s):  Right total hip arthroplasty vs femoral neck fracture in-situ screw fixation          No past medical history on file.   Past Surgical History:   Procedure Laterality Date    ESOPHAGOSCOPY, GASTROSCOPY, DUODENOSCOPY (EGD), COMBINED N/A 2023    Procedure: Esophagoscopy, gastroscopy, duodenoscopy (EGD), combined with biopsies for h.Pylori;  Surgeon: Emanuel Orourke MD;  Location:  GI      Allergies   Allergen Reactions    Cats Other (See Comments)     Sneezing, runny nose    Pollen Extract Other (See Comments)     Sneezing, runny nose      Social History     Tobacco Use    Smoking status: Not on file    Smokeless tobacco: Not on file   Substance Use Topics    Alcohol use: Not on file      Wt Readings from Last 1 Encounters:   23 53.7 kg (118 lb 6.2 oz)        Anesthesia Evaluation            ROS/MED HX  ENT/Pulmonary:  - neg pulmonary ROS     Neurologic: Comment: Lumbar surgery with hardware implantation - neg neurologic ROS     Cardiovascular:  - neg cardiovascular ROS     METS/Exercise Tolerance: >4 METS    Hematologic:  - neg hematologic  ROS     Musculoskeletal: Comment: Right hip fracture - neg musculoskeletal ROS     GI/Hepatic:  - neg GI/hepatic ROS     Renal/Genitourinary:  - neg Renal ROS     Endo:  - neg endo ROS     Psychiatric/Substance Use:  - neg psychiatric ROS     Infectious Disease:  - neg infectious disease ROS     Malignancy:  - neg malignancy ROS     Other:  - neg other ROS          Physical Exam    Airway        Mallampati: I   TM distance: > 3 FB   Neck ROM: full   Mouth opening: > 3 cm    Respiratory Devices and Support         Dental       (+) Completely normal teeth      Cardiovascular   cardiovascular exam normal       Rhythm and rate: regular and normal     Pulmonary   pulmonary exam normal        breath sounds clear to auscultation            OUTSIDE LABS:  CBC:   Lab Results   Component Value Date    WBC 7.1 09/08/2023    WBC 4.3 05/09/2023    HGB 11.1 (L) 09/08/2023    HGB 10.6 (L) 05/09/2023    HCT 33.8 (L) 09/08/2023    HCT 33.3 (L) 05/09/2023     (L) 09/08/2023     05/09/2023     BMP:   Lab Results   Component Value Date     09/08/2023     05/09/2023    POTASSIUM 4.3 09/08/2023    POTASSIUM 3.8 09/07/2023    CHLORIDE 107 09/08/2023    CHLORIDE 104 05/09/2023    CO2 25 09/08/2023    CO2 27 05/09/2023    BUN 16.3 09/08/2023    BUN 20.9 05/09/2023    CR 0.70 09/08/2023    CR 0.64 09/07/2023     (H) 09/08/2023    GLC 92 05/09/2023     COAGS:   Lab Results   Component Value Date    PTT 31 05/08/2023    INR 1.00 05/08/2023     POC: No results found for: BGM, HCG, HCGS  HEPATIC:   Lab Results   Component Value Date    ALBUMIN 3.8 05/08/2023    PROTTOTAL 5.8 (L) 05/08/2023    ALT 13 05/08/2023    AST 25 05/08/2023    ALKPHOS 76 05/08/2023    BILITOTAL 0.2 05/08/2023     OTHER:   Lab Results   Component Value Date    KWESI 8.8 09/08/2023    MAG 1.8 09/07/2023    LIPASE 30 05/08/2023       Anesthesia Plan    ASA Status:  2    NPO Status:  NPO Appropriate    Anesthesia Type: General.     - Airway: ETT   Induction: Intravenous.   Maintenance: Inhalation.        Consents    Anesthesia Plan(s) and associated risks, benefits, and realistic alternatives discussed. Questions answered and patient/representative(s) expressed understanding.     - Discussed: Risks, Benefits and Alternatives for the PROCEDURE were discussed     - Discussed with:  Patient       Use of blood products discussed: Yes.     - Discussed with: Patient.     - Consented: consented to blood products            Reason for refusal: other.     Postoperative Care    Pain management: IV analgesics, Oral pain medications.   PONV prophylaxis: Ondansetron (or other 5HT-3), Dexamethasone or Solumedrol     Comments:    Other Comments: Received fentanyl 25 mcg IV in  pre-op for hip pain. Alert and satting well on NC. Consented to post-op pain block if needed.    Lumbar surgery with hardware implantation; therefore, will proceed with YEN, 1 PIV.             Kami Neal MD

## 2023-09-08 NOTE — PROGRESS NOTES
Notified that patient retaining 600 cc.  Given that there is plan for surgery later today we will place Qureshi catheter ordered.    Rhys Alvarado MD

## 2023-09-08 NOTE — ANESTHESIA PROCEDURE NOTES
Airway       Patient location during procedure: OR       Procedure Start/Stop Times: 9/8/2023 9:44 AM  Staff -        CRNA: Israel Valverde APRN CRNA       Performed By: CRNA  Consent for Airway        Urgency: elective  Indications and Patient Condition       Indications for airway management: junito-procedural       Induction type:intravenous       Mask difficulty assessment: 1 - vent by mask    Final Airway Details       Final airway type: endotracheal airway       Successful airway: ETT - single and Oral  Endotracheal Airway Details        ETT size (mm): 7.0       Cuffed: yes       Cuff volume (mL): 5       Successful intubation technique: direct laryngoscopy       DL Blade Type: Briseno 2       Grade View of Cords: 1       Adjucts: stylet       Position: Right       Measured from: gums/teeth       Secured at (cm): 21       Bite block used: Soft    Post intubation assessment        Placement verified by: capnometry, equal breath sounds and chest rise        Number of attempts at approach: 1       Secured with: plastic tape       Ease of procedure: easy       Dentition: Intact and Unchanged    Medication(s) Administered   Medication Administration Time: 9/8/2023 9:44 AM

## 2023-09-08 NOTE — CONSULTS
[facility]  WOC Nurse Inpatient Assessment     Consulted for: Right lateral leg    Patient History (according to provider note(s):      Ines Harry is a 77 year old female with a history of Spinal Stenosis, Duodenal Ulcer, RYGB, HTN, Asthma, Nephrolithiasis, Seasonal Allergies, Aortic Stenosis, Aortic Regurgitation, SCC of the Parotid Gland, Depression who presents via EMS from [facility] for right femoral head fracture.      Assessment:      Areas visualized during today's visit: Focused:    Wound location: Rright lateral leg  Last photo: 9/8/23    Wound due to: Trauma  Wound history/plan of care: Patient reports she bumped into something about a week ago.  She reports this has happened several times before.    Wound base: 100 % dry drainage     Palpation of the wound bed: normal      Drainage: none     Description of drainage: none     Measurements (length x width x depth, in cm): 1  x 0.8 cm      Tunneling: N/A     Undermining: N/A  Periwound skin: Intact      Color: normal and consistent with surrounding tissue      Temperature: normal   Odor: none  Pain: denies , none   Pain interventions prior to dressing change: N/A  Treatment goal: Heal   STATUS: initial assessment  Supplies ordered: at bedside       Treatment Plan:     Right lateral shin wound(s): Leave open to air     Orders: Written    RECOMMEND PRIMARY TEAM ORDER: None, at this time  Education provided: plan of care  Discussed plan of care with: Patient, Family, and Nurse  WOC nurse follow-up plan: weekly  Notify WOC if wound(s) deteriorate.  Nursing to notify the Provider(s) and re-consult the WOC Nurse if new skin concern.    DATA:     Current support surface: Standard  Standard gel/foam mattress (IsoFlex, Atmos air, etc)  BMI: Body mass index is 19.4 kg/m .   Active diet order: Orders Placed This Encounter      Advance Diet as Tolerated: Regular Diet Adult     Output: I/O last 3 completed shifts:  In: -   Out: 450  [Urine:450]     Labs:   Recent Labs   Lab 09/08/23  0411   HGB 11.1*   WBC 7.1     Pressure injury risk assessment:   Sensory Perception: 4-->no impairment  Moisture: 4-->rarely moist  Activity: 2-->chairfast  Mobility: 2-->very limited  Nutrition: 3-->adequate  Friction and Shear: 2-->potential problem  Shantanu Score: 17    ABDIEL Fitzgerald  [facility]  Dept. Office Number: [phone]

## 2023-09-08 NOTE — BRIEF OP NOTE
[facility]    Brief Operative Note    Pre-operative diagnosis: Closed fracture of neck of right femur, initial encounter (H) [S72.001A]  Post-operative diagnosis Same as pre-operative diagnosis    Procedure: Procedure(s):  Right total hip arthroplasty  Surgeon: Surgeon(s) and Role:     * Tarah Almaguer MD - Primary     * Juan Jose Zuñiga PA-C - Assisting  Anesthesia: General   Estimated Blood Loss: 200 mL from 9/8/2023  9:34 AM to 9/8/2023 11:51 AM      Drains: None  Specimens:   ID Type Source Tests Collected by Time Destination   1 : right femoral head Bone Fracture Femoral Head, Right SURGICAL PATHOLOGY EXAM Tarah Almaguer MD 9/8/2023 10:28 AM      Findings:   None.  Complications: None.  Implants:   Implant Name Type Inv. Item Serial No.  Lot No. LRB No. Used Action   IMP CUP ACE PINNACLE 48MM 1217- - [id number] Total Joint Component/Insert IMP CUP ACE PINNACLE 48MM 1217-  [employer]- [id number] Right 1 Implanted   IMP APEX HOLE ELIMINATOR HIP DEPUY DURALOC 1246- - [id number] Metallic Hardware/[employer] IMP APEX HOLE ELIMINATOR HIP DEPUY DURALOC 1246-  [employer]- [id number] Right 1 Implanted   IMP SCR BONE CAN ACE 6.5X25MM 1217- - [id number] Metallic Hardware/[employer] IMP SCR BONE CAN ACE 6.5X25MM 1217-  [employer]- [id number] Right 1 Implanted   IMP SCR DEPUY PINNACLE CANC 6.5X15MM 1217- - [id number] Metallic Hardware/[employer] IMP SCR DEPUY PINNACLE CANC 6.5X15MM 1217-  [employer]- [id number] Right 1 Implanted   IMP LINER HIP DEPUY PINNACLE ALTRX [id number] +4 [id number] - [id number] Total Joint Component/Insert IMP LINER HIP DEPUY PINNACLE ALTRX 28X74MF +4 [id number]  [employer]- [id number] Right 1 Implanted   STEM FEMUR TRI-LOCK BPS SZ6 SO - [id number] Total Joint Component/Insert STEM FEMUR TRI-LOCK BPS SZ6 SO  [employer] [facility]- [id number] Right 1 Implanted   IMP HEAD FEMORAL DEPUY  32MM +1 1365- - [id number] Total Joint Component/Insert IMP HEAD FEMORAL DEPUY 32MM +1 9337-  &[facility]- [id number] Right 1 Implanted

## 2023-09-09 ENCOUNTER — APPOINTMENT (OUTPATIENT)
Dept: PHYSICAL THERAPY | Facility: CLINIC | Age: 78
DRG: 522 | End: 2023-09-09
Attending: PHYSICIAN ASSISTANT
Payer: COMMERCIAL

## 2023-09-09 LAB
HGB BLD-MCNC: 9.2 G/DL (ref 11.7–15.7)
MAGNESIUM SERPL-MCNC: 2.4 MG/DL (ref 1.7–2.3)
POTASSIUM SERPL-SCNC: 4.2 MMOL/L (ref 3.4–5.3)

## 2023-09-09 PROCEDURE — 97530 THERAPEUTIC ACTIVITIES: CPT | Mod: GP | Performed by: PHYSICAL THERAPIST

## 2023-09-09 PROCEDURE — 99222 1ST HOSP IP/OBS MODERATE 55: CPT | Performed by: INTERNAL MEDICINE

## 2023-09-09 PROCEDURE — 97116 GAIT TRAINING THERAPY: CPT | Mod: GP | Performed by: PHYSICAL THERAPIST

## 2023-09-09 PROCEDURE — 36415 COLL VENOUS BLD VENIPUNCTURE: CPT | Performed by: INTERNAL MEDICINE

## 2023-09-09 PROCEDURE — 258N000003 HC RX IP 258 OP 636: Performed by: PHYSICIAN ASSISTANT

## 2023-09-09 PROCEDURE — 120N000001 HC R&B MED SURG/OB

## 2023-09-09 PROCEDURE — 250N000013 HC RX MED GY IP 250 OP 250 PS 637: Performed by: INTERNAL MEDICINE

## 2023-09-09 PROCEDURE — 250N000011 HC RX IP 250 OP 636: Performed by: PHYSICIAN ASSISTANT

## 2023-09-09 PROCEDURE — 97161 PT EVAL LOW COMPLEX 20 MIN: CPT | Mod: GP | Performed by: PHYSICAL THERAPIST

## 2023-09-09 PROCEDURE — 250N000011 HC RX IP 250 OP 636: Mod: JZ | Performed by: PHYSICIAN ASSISTANT

## 2023-09-09 PROCEDURE — 83735 ASSAY OF MAGNESIUM: CPT | Performed by: INTERNAL MEDICINE

## 2023-09-09 PROCEDURE — 250N000013 HC RX MED GY IP 250 OP 250 PS 637: Performed by: PHYSICIAN ASSISTANT

## 2023-09-09 PROCEDURE — 84132 ASSAY OF SERUM POTASSIUM: CPT | Performed by: INTERNAL MEDICINE

## 2023-09-09 PROCEDURE — 99232 SBSQ HOSP IP/OBS MODERATE 35: CPT | Performed by: INTERNAL MEDICINE

## 2023-09-09 PROCEDURE — 85018 HEMOGLOBIN: CPT | Performed by: PHYSICIAN ASSISTANT

## 2023-09-09 RX ADMIN — OXYCODONE HYDROCHLORIDE 10 MG: 10 TABLET ORAL at 20:32

## 2023-09-09 RX ADMIN — METHOCARBAMOL 500 MG: 500 TABLET ORAL at 02:47

## 2023-09-09 RX ADMIN — SODIUM CHLORIDE, POTASSIUM CHLORIDE, SODIUM LACTATE AND CALCIUM CHLORIDE: 600; 310; 30; 20 INJECTION, SOLUTION INTRAVENOUS at 02:48

## 2023-09-09 RX ADMIN — ENOXAPARIN SODIUM 30 MG: 30 INJECTION SUBCUTANEOUS at 08:45

## 2023-09-09 RX ADMIN — SERTRALINE HYDROCHLORIDE 150 MG: 100 TABLET ORAL at 08:44

## 2023-09-09 RX ADMIN — OXYCODONE HYDROCHLORIDE 5 MG: 5 TABLET ORAL at 08:58

## 2023-09-09 RX ADMIN — SENNOSIDES AND DOCUSATE SODIUM 1 TABLET: 50; 8.6 TABLET ORAL at 08:44

## 2023-09-09 RX ADMIN — ACETAMINOPHEN 975 MG: 325 TABLET, FILM COATED ORAL at 22:31

## 2023-09-09 RX ADMIN — SENNOSIDES AND DOCUSATE SODIUM 1 TABLET: 50; 8.6 TABLET ORAL at 20:31

## 2023-09-09 RX ADMIN — ACETAMINOPHEN 975 MG: 325 TABLET, FILM COATED ORAL at 07:02

## 2023-09-09 RX ADMIN — TRAZODONE HYDROCHLORIDE 50 MG: 50 TABLET ORAL at 22:31

## 2023-09-09 RX ADMIN — Medication 50 MCG: at 08:44

## 2023-09-09 RX ADMIN — PANTOPRAZOLE SODIUM 40 MG: 40 TABLET, DELAYED RELEASE ORAL at 20:31

## 2023-09-09 RX ADMIN — PANTOPRAZOLE SODIUM 40 MG: 40 TABLET, DELAYED RELEASE ORAL at 08:44

## 2023-09-09 RX ADMIN — CEFAZOLIN 1 G: 1 INJECTION, POWDER, FOR SOLUTION INTRAMUSCULAR; INTRAVENOUS at 02:01

## 2023-09-09 RX ADMIN — OXYCODONE HYDROCHLORIDE 5 MG: 5 TABLET ORAL at 02:47

## 2023-09-09 RX ADMIN — POLYETHYLENE GLYCOL 3350 17 G: 17 POWDER, FOR SOLUTION ORAL at 08:45

## 2023-09-09 RX ADMIN — ACETAMINOPHEN 975 MG: 325 TABLET, FILM COATED ORAL at 14:03

## 2023-09-09 ASSESSMENT — ACTIVITIES OF DAILY LIVING (ADL)
DRESSING/BATHING_DIFFICULTY: NO
ADLS_ACUITY_SCORE: 46
ADLS_ACUITY_SCORE: 46
CHANGE_IN_FUNCTIONAL_STATUS_SINCE_ONSET_OF_CURRENT_ILLNESS/INJURY: NO
DEPENDENT_IADLS:: INDEPENDENT
ADLS_ACUITY_SCORE: 46
ADLS_ACUITY_SCORE: 46
DIFFICULTY_EATING/SWALLOWING: NO
ADLS_ACUITY_SCORE: 38
WEAR_GLASSES_OR_BLIND: NO
WALKING_OR_CLIMBING_STAIRS_DIFFICULTY: NO
ADLS_ACUITY_SCORE: 46
ADLS_ACUITY_SCORE: 46
DOING_ERRANDS_INDEPENDENTLY_DIFFICULTY: NO
ADLS_ACUITY_SCORE: 46
ADLS_ACUITY_SCORE: 38
ADLS_ACUITY_SCORE: 21
CONCENTRATING,_REMEMBERING_OR_MAKING_DECISIONS_DIFFICULTY: NO
TOILETING_ISSUES: NO
ADLS_ACUITY_SCORE: 38
ADLS_ACUITY_SCORE: 46

## 2023-09-09 NOTE — PROGRESS NOTES
"Ines Harry  2023  POD # 1 s/p RTHA  Admit Date:  2023      Doing well.  Clean wound without signs of infection.  No immediate surgical complications identified.  No excessive bleeding  Pain well-controlled.  Objective: no chest pain or shortness of breath.   Blood pressure 131/59, pulse 74, temperature 97.5  F (36.4  C), temperature source Temporal, resp. rate 18, height 1.664 m (5' 5.5\"), weight 53.7 kg (118 lb 6.2 oz), SpO2 97 %.    Temperatures:  Current - Temp: 97.5  F (36.4  C); Max - Temp  Av.7  F (36.5  C)  Min: 97.3  F (36.3  C)  Max: 98.2  F (36.8  C)  Pulse range: Pulse  Av.4  Min: 64  Max: 99  Blood pressure range: Systolic (24hrs), Av , Min:89 , Max:131   ; Diastolic (24hrs), Av, Min:37, Max:64    Exam:  CMS: intact  alert, stable, wound ok  Dressing c/d/I  Calf s/nt  DF/PF   Communicates clearly with examiner    Labs:  Recent Labs   Lab Test 23  0603 23  0411 23  1407   POTASSIUM 4.2 4.3 3.8     Recent Labs   Lab Test 23  0603 23  0411 23  0533   HGB 9.2* 11.1* 10.6*     Recent Labs   Lab Test 23  0420   INR 1.00     Recent Labs   Lab Test 23  0411 23  0533 23  0420   * 170 183       PLAN: WBAT in the RLE. No hip abduction. Continue dvt ppx. Plan for discharge tomorrow or Monday. Work with therapy. Use walker for ambulatory aide.       "

## 2023-09-09 NOTE — PLAN OF CARE
Goal Outcome Evaluation:    Patient vital signs are at baseline: Yes  Patient able to ambulate as they were prior to admission or with assist devices provided by therapies during their stay:  No,  Reason:  Assists of x1 gait belt walker.  Patient MUST void prior to discharge:  Yes Via Mckinney Catheter  Patient able to tolerate oral intake:  Yes  Pain has adequate pain control using Oral analgesics:  Yes PRN Robaxin/Atarax and schedule Tylenol given.  Does patient have an identified :  Yes  Has goal D/C date and time been discussed with patient:  Yes       Patient alert and oriented x4, Vital signed stable, except BP soften of BP 99/44, Patient On CAPNO monitoring. Oxygen 1 L via N/C, sat 97%. Denies N/V. Abdomen soft, Bowel sound  active.voiding via mckinney cathter. Dressing Clean dry intact. Pt ambulate in the  the room up with Assists of x1 gait belt walker set the edge of the bed and walked from bed to the chair and back to bed. Will continue to provide supportive care.

## 2023-09-09 NOTE — CONSULTS
Care Management Initial Consult    General Information  Assessment completed with: Patient,    Type of CM/SW Visit: Initial Assessment    Primary Care Provider verified and updated as needed: Yes   Readmission within the last 30 days:        Reason for Consult: care coordination/care conference, discharge planning  Advance Care Planning:            Communication Assessment  Patient's communication style: spoken language (English or Bilingual)             Cognitive  Cognitive/Neuro/Behavioral: WDL                      Living Environment:   People in home: alone     Current living Arrangements: house      Able to return to prior arrangements: no       Family/Social Support:  Care provided by: self  Provides care for: no one  Marital Status:              Description of Support System: Supportive, Involved         Current Resources:   Patient receiving home care services: No     Community Resources: None  Equipment currently used at home: none  Supplies currently used at home:  none    Employment/Financial:  Employment Status: retired        Financial Concerns: No concerns identified           Does the patient's insurance plan have a 3 day qualifying hospital stay waiver?  No    Lifestyle & Psychosocial Needs:  Social Determinants of Health     Tobacco Use: Not on file   Alcohol Use: Not on file   Financial Resource Strain: Not on file   Food Insecurity: Not on file   Transportation Needs: Not on file   Physical Activity: Not on file   Stress: Not on file   Social Connections: Not on file   Intimate Partner Violence: Not on file   Depression: Not on file   Housing Stability: Not on file       Functional Status:  Prior to admission patient needed assistance:   Dependent ADLs:: Independent  Dependent IADLs:: Independent       Mental Health Status:  Mental Health Status: No Current Concerns       Chemical Dependency Status:  Chemical Dependency Status: No Current Concerns             Values/Beliefs:  Spiritual,  Cultural Beliefs, Yarsani Practices, Values that affect care: no               Additional Information:  CM consulted for discharge planning. Patient s/p Rt total hip arthroplasty. Met with patient to discuss discharge planning and therapy recommendation for TCU. Information given on TCU, medicare.gov along with TCU packet. Patient lives alone independently. She does not use a walker or cane. States she has support from her significant other, neighbors and son. Daughter is also coming from [place] for a couple of days. Patient considering a discharge to TCU. She  will review the TCU information and prefers CM return for choices.     Will continue to follow patient for discharge planning.     Henry Kerns RN Case Manager  Inpatient Care Coordination   [facility]   [phone]      Henry Kerns RN

## 2023-09-09 NOTE — PROGRESS NOTES
Pt is A&OX4, on CAPNO monitoring. LS CTA, encouraged to use IS. Oxygen 1 L via N/C, sat 97%. No N/V. Abdomen soft, BS hypoactive.voiding via mckinney cathter. Dressing CDI. PPP.no edema. No N/T. Pt had dinner, consumed 40%.IV  CDI, Pt Mg level was 1.5. Mg IV is infusing.lab is ordered for 06:00 AM.hip abduction pillow in place.ice applied to R/hip. Pt rates her pain mild at rest and takes Oxycodone  5 mg.will continue monitoring.

## 2023-09-09 NOTE — PROGRESS NOTES
[facility]    Medicine Progress Note - Hospitalist Service    Date of Admission:  9/7/2023    Assessment & Plan   77 year old female with a history of Spinal Stenosis, Duodenal Ulcer, RYGB, HTN, Asthma, Nephrolithiasis, Seasonal Allergies, Aortic Stenosis, Aortic Regurgitation, SCC of the Parotid Gland, Depression who presents via EMS from [facility] for right femoral head fracture.      Right Femoral Neck Fracture s/p surgical repair 9/8/23 by Dr. Almaguer  Acute blood loss anemia associated with surgery:  -  Post-op site cares, activity restrictions, pain medications, DVT proph per orthopedics.  -  No overt ongoing bleeding, no indication for a current transfusion.    Urinary Retention:  -  Noted before surgery with mckinney placed, likely related to initial pain/poor mobility.  Removed mckinney today, will see how voids.  Straight cath PRN.    Hypomagnesemia:  -  Replace per protocol    Hx Aortic Stenosis and Aortic Regurgitation with severe stenosis on updated echocardiogram:  -  Negative stress test 2021.  No recent chest pain, increased SOB.  She has been functionally active.   - murmur noted on exam.  Most recent echo 12/2020 revealed mild aortic stenosis and aortic regurgitation.  Pt does report occasional EDWARDS but stable lately.  Most recent cardiac stress test was normal 7/2021.  New echocardiogram suggested valve stenosis has worsened significantly.  EF normal range.  Will need to be cautious with meds/fluids post-op and need to avoid hypotension.    -  Cardiology consult appreciated, they are arranging follow-up in their structural heart clinic.     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  - continue Omeprazole     HTN:  -  Hold Lisinopril, BP improved but normal range without it right now.        Hx Seasonal Allergies and Asthma - no signs of acute flare.     Depression - continue Trazodone and  Sertraline    Medical Decision Making       40 MINUTES SPENT BY ME on the date of service doing chart review, history, exam, documentation & further activities per the note.      Labs/Imaging Reviewed:  See Information above and Data section below    PPE Used:  Mask       Diet: Advance Diet as Tolerated: Regular Diet Adult    DVT Prophylaxis: Defer to Orthopedics  Qureshi Catheter: Not present  Lines: None     Cardiac Monitoring: None  Code Status: Full Code      Clinically Significant Risk Factors                                 Disposition Plan      Expected Discharge Date: 09/11/2023      Destination: inpatient rehabilitation facility            Stephen Robertson DO  Hospitalist Service  [facility]  Securely message with [employer] (more info)  Text page via [facility] Paging/Directory   ______________________________________________________________________    Interval History   Feeling well post-op, denied any pain at rest but some when up with therapy earlier today.  No SOB or nausea.  No other major new complaints.    Physical Exam   Vital Signs: Temp: 97.7  F (36.5  C) Temp src: Temporal BP: 132/60 Pulse: 82   Resp: 16 SpO2: 98 % O2 Device: None (Room air) Oxygen Delivery: 2 LPM  Weight: 118 lbs 6.19 oz      GEN:  Alert, oriented x 3, appears comfortable, no overt distress.  HEENT:  Normocephalic/atraumatic, no scleral icterus, no nasal discharge, mouth moist.  CV:  Regular rate and rhythm, soft murmur, no rub.  LUNGS:  Clear to auscultation bilaterally without rales/rhonchi/wheezing/retractions.  Symmetric chest rise on inhalation noted.  ABD:  Active bowel sounds, soft, non-tender/non-distended.  No guarding/rigidity.  EXT:  Trace LE edema.  No cyanosis.  Detailed surgical site exam deferred to orthopedics.  Moving feet well.  SKIN:  Dry to touch, no exanthems noted in the visualized areas.    Medications    lactated ringers Stopped (09/09/23 0910)      acetaminophen  975 mg Oral Q8H     enoxaparin ANTICOAGULANT  30 mg Subcutaneous Q24H    pantoprazole  40 mg Oral BID    polyethylene glycol  17 g Oral Daily    senna-docusate  1 tablet Oral BID    sertraline  150 mg Oral Daily    sodium chloride (PF)  3 mL Intracatheter Q8H    sodium chloride (PF)  3 mL Intracatheter Q8H    traZODone  50 mg Oral At Bedtime    cholecalciferol  50 mcg Oral Daily       Data     Labs and Imaging results below reviewed today.    I have personally reviewed the following data over the past 24 hrs:    N/A  \   9.2 (L)   / N/A     N/A N/A N/A /  N/A   4.2 N/A N/A \           No results found for this or any previous visit (from the past 24 hour(s)).

## 2023-09-09 NOTE — CONSULTS
[facility]    Cardiology Consultation     Date of Admission:  9/7/2023    Review of the result(s) of each unique test - Last ECG echocardiogram CBC BMP.     Assessment & Plan   Ines Harry is a 77 year old female who was admitted on 9/7/2023.    1.  Severe aortic stenosis-GEOVANNA 0.7 cm , mean 35 mmHg  2.  Mild mitral stenosis-mean 6 mmHg at heart rate 72 bpm  3.  Negative nuclear stress test in 2021  4.  Rt hip fracture s/p arthroplasty.    Recommendation  1.  On review of the echocardiogram the aortic valve appears heavily calcified and stenosed.  The patient would benefit from being seen in structural cardiology clinic for evaluation of TAVR once stable from Ortho standpoint.  This is going to be an outpatient clinic appointment which we will schedule.   2. Avoid nitrates and hypotension.   3. No further recommendations. Will sign off for now.    Moderate complexity     DUANE Gabriel  Staff Cardiologist  [facility]    History of Present Illness   Ines Harry is a 77 year old female who presented to the hospital for femoral fracture after a ground-level fall.  Cardiology has been consulted because an updated echocardiogram from this admission shows severe aortic stenosis [GEOVANNA 0.7 cm , mean gradient 35 mmHg].  This represents a rapid progression when compared to echocardiogram in 12/2020 which showed mild aortic stenosis.    Past Medical History   History reviewed. No pertinent past medical history.    Past Surgical History   Past Surgical History:   Procedure Laterality Date    ESOPHAGOSCOPY, GASTROSCOPY, DUODENOSCOPY (EGD), COMBINED N/A 5/8/2023    Procedure: Esophagoscopy, gastroscopy, duodenoscopy (EGD), combined with biopsies for h.Pylori;  Surgeon: Emanuel Orourke MD;  Location:  GI       Prior to Admission Medications   Prior to Admission Medications   Prescriptions Last Dose Informant Patient Reported? Taking?   fexofenadine (ALLEGRA) 180 MG tablet 9/7/2023   "Yes Yes   Sig: Take 180 mg by mouth daily   lisinopril (ZESTRIL) 10 MG tablet 9/7/2023  Yes Yes   Sig: Take 10 mg by mouth daily   omeprazole (PRILOSEC) 20 MG DR capsule 9/7/2023  Yes Yes   Sig: Take 20 mg by mouth daily   sertraline (ZOLOFT) 100 MG tablet 9/7/2023  Yes Yes   Sig: Take 150 mg by mouth daily   traZODone (DESYREL) 50 MG tablet 9/6/2023  Yes Yes   Sig: Take 50 mg by mouth At Bedtime      Facility-Administered Medications: None     Current Facility-Administered Medications   Medication Dose Route Frequency    acetaminophen  975 mg Oral Q8H    enoxaparin ANTICOAGULANT  30 mg Subcutaneous Q24H    pantoprazole  40 mg Oral BID    polyethylene glycol  17 g Oral Daily    senna-docusate  1 tablet Oral BID    sertraline  150 mg Oral Daily    sodium chloride (PF)  3 mL Intracatheter Q8H    sodium chloride (PF)  3 mL Intracatheter Q8H    traZODone  50 mg Oral At Bedtime    cholecalciferol  50 mcg Oral Daily     Current Facility-Administered Medications   Medication Last Rate    lactated ringers Stopped (09/09/23 0910)     Allergies   Allergies   Allergen Reactions    Cats Other (See Comments)     Sneezing, runny nose    Pollen Extract Other (See Comments)     Sneezing, runny nose       Social History        Family History   No FH of BAV    Review of Systems   A comprehensive review of system was performed and is negative other than that noted in the HPI or here.     Physical Exam   Vital Signs with Ranges  Temp:  [97.5  F (36.4  C)-98.1  F (36.7  C)] 97.7  F (36.5  C)  Pulse:  [64-82] 82  Resp:  [14-21] 16  BP: ()/(44-60) 132/60  SpO2:  [97 %-99 %] 98 %  Wt Readings from Last 4 Encounters:   09/07/23 53.7 kg (118 lb 6.2 oz)   05/09/23 51.2 kg (112 lb 12.8 oz)     I/O last 3 completed shifts:  In: -   Out: 1450 [Urine:1450]      Vitals: /60 (BP Location: Right arm)   Pulse 82   Temp 97.7  F (36.5  C) (Temporal)   Resp 16   Ht 1.664 m (5' 5.5\")   Wt 53.7 kg (118 lb 6.2 oz)   SpO2 98%   BMI 19.40 " kg/m      Physical Exam:   General - Alert and in no acute distress  Respiratory - CTAB  Cardiovascular - systolic murmur. Soft s2  Gastrointestinal - Non distended  Psych - Appropriate affect     No lab results found in last 7 days.    Invalid input(s): TROPONINIES    Recent Labs   Lab 09/09/23  0603 09/08/23  1339 09/08/23  0411 09/07/23  1407   WBC  --   --  7.1  --    HGB 9.2*  --  11.1*  --    MCV  --   --  102*  --    PLT  --   --  137*  --    NA  --   --  141  --    POTASSIUM 4.2  --  4.3 3.8   CHLORIDE  --   --  107  --    CO2  --   --  25  --    BUN  --   --  16.3  --    CR  --  0.82 0.70 0.64   GFRESTIMATED  --  73 89 >90   ANIONGAP  --   --  9  --    KWESI  --   --  8.8  --    GLC  --   --  101*  --      No results for input(s): CHOL, HDL, LDL, TRIG, CHOLHDLRATIO in the last 26757 hours.  Recent Labs   Lab 09/09/23  0603 09/08/23  0411   WBC  --  7.1   HGB 9.2* 11.1*   HCT  --  33.8*   MCV  --  102*   PLT  --  137*     No results for input(s): PH, PHV, PO2, PO2V, SAT, PCO2, PCO2V, HCO3, HCO3V in the last 168 hours.  No results for input(s): NTBNPI, NTBNP in the last 168 hours.  No results for input(s): DD in the last 168 hours.  No results for input(s): SED, CRP in the last 168 hours.  Recent Labs   Lab 09/08/23  0411   *     No results for input(s): TSH in the last 168 hours.  No results for input(s): COLOR, APPEARANCE, URINEGLC, URINEBILI, URINEKETONE, SG, UBLD, URINEPH, PROTEIN, UROBILINOGEN, NITRITE, LEUKEST, RBCU, WBCU in the last 168 hours.    Imaging:  Recent Results (from the past 48 hour(s))   XR Pelvis Port 1/2 Views    Narrative    This exam was marked as non-reportable because it will not be read by a   radiologist or a [facility] non-radiologist provider.             Echo:  No results found for this or any previous visit (from the past 4320 hour(s)).      This note was completed in part using dictation via the Dragon voice recognition software. Some word and grammatical errors may occur  and must be interpreted in the appropriate clinical context.  If there are any questions pertaining to this issue, please contact me for further clarification.

## 2023-09-09 NOTE — PLAN OF CARE
Goal Outcome Evaluation:         Patient vital signs are at baseline: Yes  Patient able to ambulate as they were prior to admission or with assist devices provided by therapies during their stay:  Yes  Patient MUST void prior to discharge:  Yes  Patient able to tolerate oral intake:  Yes  Pain has adequate pain control using Oral analgesics:  Yes  Does patient have an identified :  Yes  Has goal D/C date and time been discussed with patient:  Yes        Patient aox4. A1gbaw. Qureshi removed. Voiding. PVR under 300. No indication for straight cath. Cardiology consult. Patient will require outpatient TAVR. Patient c/o some pain, but declined PRN intervention.    PT recommending TCU, but patient hesitant and states she has neighbors that can help, but not 24/7 or to the bathroom. Social work gave information to patient to help make decision.

## 2023-09-09 NOTE — PROGRESS NOTES
"   09/09/23 0832   Appointment Info   Signing Clinician's Name / Credentials (PT) Susu Hansen, PT   Living Environment   People in Home alone  (someone can stay over night)   Current Living Arrangements house   Home Accessibility stairs to enter home   Number of Stairs, Main Entrance 4   Stair Railings, Main Entrance railings safe and in good condition   Transportation Anticipated family or friend will provide   Living Environment Comments stairs to basement laundry; doesn't have to access   Self-Care   Usual Activity Tolerance excellent   Current Activity Tolerance good   Regular Exercise Yes   Activity/Exercise Type other (see comments)  (yard work; household tasks; cuts own grass)   Equipment Currently Used at Home none  (boyfriend has a walker (4WW))   Fall history within last six months yes   Number of times patient has fallen within last six months 1  (doesn't remember others)   Activity/Exercise/Self-Care Comment normally independent with mobility and cares   General Information   Onset of Illness/Injury or Date of Surgery 09/07/23   Referring Physician Juan Jose Zuñiga PA-C/Dr. Almaguer   Patient/Family Therapy Goals Statement (PT) open to rehab; unable to specify people to stay with her at discharge   Pertinent History of Current Problem (include personal factors and/or comorbidities that impact the POC) per chart: \"77 year old female with a history of Spinal Stenosis, Duodenal Ulcer, RYGB, HTN, Asthma, Nephrolithiasis, Seasonal Allergies, Aortic Stenosis, Aortic Regurgitation, SCC of the Parotid Gland, Depression who presents via EMS from [facility] for right femoral head fracture. \"; Seen POD #1 s/p RTHA; see medical record for further information   Existing Precautions/Restrictions fall;no active hip ABD   Weight-Bearing Status - RLE weight-bearing as tolerated   Heart Disease Risk Factors Medical history   General Observations patient in bed; significant other present   Cognition   Cognitive " Status Comments appears intact; ? forgetful   Pain Assessment   Patient Currently in Pain Yes, see Vital Sign flowsheet  (5/10 with activity)   Integumentary/Edema   Integumentary/Edema Comments R hip incision   Posture    Posture Comments forward flexed at trunk   Range of Motion (ROM)   ROM Comment R LE limited by hip precautions and pain   Strength (Manual Muscle Testing)   Strength Comments functional weakness noted with mobility; further limited by R hip pain   Bed Mobility   Comment, (Bed Mobility) CGA/SBA with HOB elevated and use of bedrail   Transfers   Comment, (Transfers) Min A for sit>stand; use of walker   Gait/Stairs (Locomotion)   Comment, (Gait/Stairs) current need for walker and min A   Balance   Balance Comments impaired; current need for walker and assist   Sensory Examination   Sensory Perception Comments intact   Clinical Impression   Criteria for Skilled Therapeutic Intervention Yes, treatment indicated   PT Diagnosis (PT) impaired functional mobility   Influenced by the following impairments pain; hip precautions; impaired balance; decreased R LE ROM/strength   Functional limitations due to impairments impaired independence with functional mobility and cares secondary to above deficits   Clinical Presentation (PT Evaluation Complexity) Stable/Uncomplicated   Clinical Presentation Rationale clinical judgement; level of assist   Clinical Decision Making (Complexity) low complexity   Planned Therapy Interventions (PT) bed mobility training;balance training;gait training;ROM (range of motion);strengthening;transfer training;stair training;progressive activity/exercise   Anticipated Equipment Needs at Discharge (PT) walker, rolling   Risk & Benefits of therapy have been explained evaluation/treatment results reviewed;care plan/treatment goals reviewed;risks/benefits reviewed;current/potential barriers reviewed;participants voiced agreement with care plan;participants included;patient   Clinical  Impression Comments patient below reported baseline level of function   PT Total Evaluation Time   PT Joal, Low Complexity Minutes (14937) 10   Plan of Care Review   Plan of Care Reviewed With patient;significant other;other (see comments)  (Nurse)   Physical Therapy Goals   PT Frequency Daily   PT Predicted Duration/Target Date for Goal Attainment 09/04/23   PT Goals Bed Mobility;Transfers;Gait;Stairs   PT: Bed Mobility Independent;Supine to/from sit;Within precautions   PT: Transfers Modified independent;Sit to/from stand;Bed to/from chair;Assistive device;Within precautions   PT: Gait Modified independent;Rolling walker;100 feet   PT: Stairs Modified independent;4 stairs;Assistive device  (with rail)   PT Discharge Planning   PT Discharge Recommendation (DC Rec) Transitional Care Facility   PT Rationale for DC Rec Patient below baseline level of function and would benefit from ongoing PT in hospital and at TCU; Patient unable to identify anyone who could help her 24/7 initially; daughter lives in [place]; significant other doesn't stay overnight or help with toileting/bathing;; if patient could identify a caregive for discharge, could possibly return home with Home PT   PT Brief overview of current status A x 1 with walker

## 2023-09-10 ENCOUNTER — APPOINTMENT (OUTPATIENT)
Dept: PHYSICAL THERAPY | Facility: CLINIC | Age: 78
DRG: 522 | End: 2023-09-10
Payer: COMMERCIAL

## 2023-09-10 ENCOUNTER — APPOINTMENT (OUTPATIENT)
Dept: OCCUPATIONAL THERAPY | Facility: CLINIC | Age: 78
DRG: 522 | End: 2023-09-10
Attending: PHYSICIAN ASSISTANT
Payer: COMMERCIAL

## 2023-09-10 VITALS
BODY MASS INDEX: 19.03 KG/M2 | DIASTOLIC BLOOD PRESSURE: 60 MMHG | HEIGHT: 66 IN | WEIGHT: 118.39 LBS | HEART RATE: 76 BPM | SYSTOLIC BLOOD PRESSURE: 128 MMHG | TEMPERATURE: 97.6 F | OXYGEN SATURATION: 96 % | RESPIRATION RATE: 16 BRPM

## 2023-09-10 LAB
GLUCOSE BLDC GLUCOMTR-MCNC: 102 MG/DL (ref 70–99)
HGB BLD-MCNC: 8.8 G/DL (ref 11.7–15.7)
MAGNESIUM SERPL-MCNC: 1.7 MG/DL (ref 1.7–2.3)
POTASSIUM SERPL-SCNC: 4.5 MMOL/L (ref 3.4–5.3)

## 2023-09-10 PROCEDURE — 84132 ASSAY OF SERUM POTASSIUM: CPT | Performed by: INTERNAL MEDICINE

## 2023-09-10 PROCEDURE — 250N000013 HC RX MED GY IP 250 OP 250 PS 637: Performed by: PHYSICIAN ASSISTANT

## 2023-09-10 PROCEDURE — 97116 GAIT TRAINING THERAPY: CPT | Mod: GP | Performed by: PHYSICAL THERAPIST

## 2023-09-10 PROCEDURE — 36415 COLL VENOUS BLD VENIPUNCTURE: CPT | Performed by: INTERNAL MEDICINE

## 2023-09-10 PROCEDURE — 85018 HEMOGLOBIN: CPT | Performed by: PHYSICIAN ASSISTANT

## 2023-09-10 PROCEDURE — 97535 SELF CARE MNGMENT TRAINING: CPT | Mod: GO

## 2023-09-10 PROCEDURE — 83735 ASSAY OF MAGNESIUM: CPT | Performed by: INTERNAL MEDICINE

## 2023-09-10 PROCEDURE — 250N000011 HC RX IP 250 OP 636: Mod: JZ | Performed by: PHYSICIAN ASSISTANT

## 2023-09-10 PROCEDURE — 97530 THERAPEUTIC ACTIVITIES: CPT | Mod: GP | Performed by: PHYSICAL THERAPIST

## 2023-09-10 PROCEDURE — 250N000013 HC RX MED GY IP 250 OP 250 PS 637: Performed by: INTERNAL MEDICINE

## 2023-09-10 PROCEDURE — 97165 OT EVAL LOW COMPLEX 30 MIN: CPT | Mod: GO

## 2023-09-10 RX ADMIN — POLYETHYLENE GLYCOL 3350 17 G: 17 POWDER, FOR SOLUTION ORAL at 07:45

## 2023-09-10 RX ADMIN — ACETAMINOPHEN 975 MG: 325 TABLET, FILM COATED ORAL at 06:32

## 2023-09-10 RX ADMIN — PANTOPRAZOLE SODIUM 40 MG: 40 TABLET, DELAYED RELEASE ORAL at 07:44

## 2023-09-10 RX ADMIN — SERTRALINE HYDROCHLORIDE 150 MG: 100 TABLET ORAL at 07:44

## 2023-09-10 RX ADMIN — OXYCODONE HYDROCHLORIDE 10 MG: 10 TABLET ORAL at 03:34

## 2023-09-10 RX ADMIN — SENNOSIDES AND DOCUSATE SODIUM 1 TABLET: 50; 8.6 TABLET ORAL at 07:44

## 2023-09-10 RX ADMIN — Medication 50 MCG: at 07:43

## 2023-09-10 RX ADMIN — HYDROXYZINE HYDROCHLORIDE 10 MG: 10 TABLET ORAL at 03:34

## 2023-09-10 RX ADMIN — OXYCODONE HYDROCHLORIDE 5 MG: 5 TABLET ORAL at 12:44

## 2023-09-10 RX ADMIN — ENOXAPARIN SODIUM 30 MG: 30 INJECTION SUBCUTANEOUS at 07:45

## 2023-09-10 ASSESSMENT — ACTIVITIES OF DAILY LIVING (ADL)
ADLS_ACUITY_SCORE: 21
PREVIOUS_RESPONSIBILITIES: MEAL PREP;HOUSEKEEPING;LAUNDRY;SHOPPING;YARDWORK

## 2023-09-10 NOTE — PROGRESS NOTES
"Ines Harry  2023  POD # 2 s/p RTHA  Admit Date:  2023      Doing well.  Clean wound without signs of infection.  No immediate surgical complications identified.  No excessive bleeding  Pain well-controlled.  Objective: no chest pain or shortness of breath.   Blood pressure 128/60, pulse 76, temperature 97.6  F (36.4  C), temperature source Temporal, resp. rate 16, height 1.664 m (5' 5.5\"), weight 53.7 kg (118 lb 6.2 oz), SpO2 96 %.    Temperatures:  Current - Temp: 97.5  F (36.4  C); Max - Temp  Av.7  F (36.5  C)  Min: 97.3  F (36.3  C)  Max: 98.2  F (36.8  C)  Pulse range: Pulse  Av.4  Min: 64  Max: 99  Blood pressure range: Systolic (24hrs), Av , Min:89 , Max:131   ; Diastolic (24hrs), Av, Min:37, Max:64    Exam:  CMS: intact  alert, stable, wound ok  Dressing c/d/I  Calf s/nt  DF/PF   Communicates clearly with examiner    Labs:  Recent Labs   Lab Test 23  0603 23  0411 23  1407   POTASSIUM 4.2 4.3 3.8     Recent Labs   Lab Test 23  0603 23  0411 23  0533   HGB 9.2* 11.1* 10.6*     Recent Labs   Lab Test 23  0420   INR 1.00     Recent Labs   Lab Test 23  0411 23  0533 23  0420   * 170 183       PLAN: WBAT in the RLE. No hip abduction. Continue dvt ppx. Plan for discharge tomorrow or Monday. Work with therapy. Use walker for ambulatory aide.       "

## 2023-09-10 NOTE — PLAN OF CARE
Physical Therapy Discharge Summary    Reason for therapy discharge:    Discharged to home with home therapy.    Progress towards therapy goal(s). See goals on Care Plan in [facility] electronic health record for goal details.  Goals partially met.  Barriers to achieving goals:   discharge from facility.    Therapy recommendation(s):    Continued therapy is recommended.  Rationale/Recommendations:  Home PT and assist from family/significant other and use of walker to maximize return to PLOF.

## 2023-09-10 NOTE — PLAN OF CARE
Occupational Therapy Discharge Summary    Reason for therapy discharge:    All goals and outcomes met, no further needs identified.    Progress towards therapy goal(s). See goals on Care Plan in [facility] electronic health record for goal details.  Acute care OT Goals met    Therapy recommendation(s):    Continued therapy is recommended.  Rationale/Recommendations:  After skilled OT intervention pt is completing self-cares with distant supervision. Anticipate pt will be able to complete self cares with modified independence in 1-2 days. At discharge recommend pt have supervision with self-cares for a day and assist with strenuous IADLs (cooking, cleaning, laundry, etc). Pt reports she can have this level of assist at discharge. Recommend home OT to progress pt's IADL independence as she lives alone and is typically independent in all IADLs..

## 2023-09-10 NOTE — PLAN OF CARE
Goal Outcome Evaluation:    Reviewed discharge instructions with patient and family. Questions answered. Patient discharged to home with family driving, meds (Oxycodone/Tylenol/Senna/Miralax/Lovenox/Atarax/Vitamin D3), discharge instructions, and belongings.

## 2023-09-10 NOTE — DISCHARGE INSTRUCTIONS
Your home care referral was sent to [facility]  If you haven't heard from them within the next 24-48 hours,  Please call them at [phone]     Remove guaze and tape dressing on Monday 9/11. There is a prineo dressing over the incision that you leave in place till your follow-up appointment in 2 weeks with [facility]. If showering today (Sunday), cover guaze and tape dressing with saran wrap to keep it dry.

## 2023-09-10 NOTE — PROGRESS NOTES
Care Management Follow Up    Length of Stay (days): 3    Expected Discharge Date: 09/12/2023     Concerns to be Addressed:     discharge planning  Patient plan of care discussed at interdisciplinary rounds: Yes    Anticipated Discharge Disposition: Home Care      Anticipated Discharge Services:Home Care PT OT HHA  Anticipated Discharge DME:      Patient/family educated on Medicare website which has current facility and service quality ratings:  yes  Education Provided on the Discharge Plan:  yes  Patient/Family in Agreement with the Plan: yes    Referrals Placed by CM/SW: Post Acute Facilities  Private pay costs discussed: Not applicable    Additional Information:  CM following for discharge planning. Therapy changed recommendation to home with home care PT OT HHA. CM spoke with patient regarding discharge with home care services. Patient prefers to discharge home with home care. She had no preference of home care agency and was agreeable to referral sent to [facility].     Patient anticipates discharging home with home care. [facility] referral pending. Patient significant other will provide transportation.     CM will continue to follow for discharge home with home care PT OT HHA.     Henry Kerns RN Case Manager  Inpatient Care Coordination   [facility]   [phone]      Henry Kerns RN    Addendum: [facility] has accepted patient. Discharge orders faxed to [facility] and [facility] updated with home care information.

## 2023-09-10 NOTE — PLAN OF CARE
Goal Outcome Evaluation:    Patient vital signs are at baseline: Yes  Patient able to ambulate as they were prior to admission or with assist devices provided by therapies during their stay:  Yes  Patient MUST void prior to discharge:  Yes voids spontaneously   Patient able to tolerate oral intake:  Yes  Pain has adequate pain control using Oral analgesics:  Yes PRN PO Atarax and Oxycodone  Does patient have an identified :  Yes  Has goal D/C date and time been discussed with patient:  Yes     Patient alert oriented x4. Vital sing within baseline, CMS intact. Dressing is intact. Assist of 1 with gait belt/walker. Pt ambulate to the bathroom, Voiding adequately x3, PVR under 300, Tolerated regular diet, Pt sleeping between care, Place to discharge to TCU.

## 2023-09-10 NOTE — PROGRESS NOTES
09/10/23 1016   Appointment Info   Signing Clinician's Name / Credentials (OT) Emerita Valverde, OTR/L   Living Environment   People in Home alone   Current Living Arrangements house   Home Accessibility stairs to enter home   Living Environment Comments Reports daughter in town until tomorrow. S.O. present and reports he can assist intermittently at discharge   Self-Care   Usual Activity Tolerance excellent   Current Activity Tolerance good   Equipment Currently Used at Home   (Step in shower. Has a comfort height toilet and standard height toilet- vanity next to it. Pt reports she has access to reacher and shower chair)   Fall history within last six months yes   Number of times patient has fallen within last six months 1   Activity/Exercise/Self-Care Comment Pt reports at baseline she is independent in self-cares without a gait aid   Instrumental Activities of Daily Living (IADL)   Previous Responsibilities meal prep;housekeeping;laundry;shopping;yardwork   IADL Comments cares for a small dog and cat   General Information   Onset of Illness/Injury or Date of Surgery 09/07/23   Referring Physician Juan Jose Zuñiga PA-C   Patient/Family Therapy Goal Statement (OT) Return home either today or tomorrow   Additional Occupational Profile Info/Pertinent History of Current Problem POD # 2 s/p RTHA post fall   Existing Precautions/Restrictions fall;no active hip ABD   Cognitive Status Examination   Affect/Mental Status (Cognitive) WFL   Follows Commands follows multi-step commands   Cognitive Status Comments Needed one cue for safety during session   Visual Perception   Visual Impairment/Limitations WFL   Sensory   Sensory Comments Pt denies BUE/BLE numbness or tingling   Pain Assessment   Patient Currently in Pain Yes, see Vital Sign flowsheet   Bed Mobility   Bed Mobility sit-supine   Sit-Supine [place] (Bed Mobility) moderate assist (50% patient effort)   Transfers   Transfers toilet transfer;shower  transfer   Shower Transfer   [place] Level (Shower Transfer) minimum assist (75% patient effort)   Toilet Transfer   [place] Level (Toilet Transfer) contact guard   Activities of Daily Living   BADL Assessment/Intervention lower body dressing   Lower Body Dressing Assessment/Training   [place] Level (Lower Body Dressing) moderate assist (50% patient effort)   Clinical Impression   Criteria for Skilled Therapeutic Interventions Met (OT) Yes, treatment indicated   OT Diagnosis Decline function   OT Problem List-Impairments impacting ADL activity tolerance impaired;balance;mobility;post-surgical precautions;pain   Assessment of Occupational Performance 3-5 Performance Deficits   Identified Performance Deficits LB dressing, toileting, bathing, strenuous IADLs   Planned Therapy Interventions (OT) ADL retraining;home program guidelines;progressive activity/exercise;risk factor education   Clinical Decision Making Complexity (OT) low complexity   Anticipated Equipment Needs Upon Discharge (OT) shower chair;reacher   Risk & Benefits of therapy have been explained evaluation/treatment results reviewed;care plan/treatment goals reviewed;risks/benefits reviewed;current/potential barriers reviewed;participants voiced agreement with care plan;participants included;patient;spouse/significant other   OT Total Evaluation Time   OT Eval, Low Complexity Minutes (25754) 7   OT Goals   Therapy Frequency (OT) One time eval and treatment   OT Predicted Duration/Target Date for Goal Attainment 09/11/23   OT Goals Lower Body Dressing;Transfers;Toilet Transfer/Toileting;Bed Mobility;OT Goal 1;OT Goal 2   OT: Lower Body Dressing Supervision/stand-by assist;within precautions   OT: Bed Mobility Supervision/stand-by assist;within precautions  (sit to supine)   OT: Transfer   (Pt will verbalize understanding of recommended vehicle and step in shower transfer techniques)   OT: Toilet Transfer/Toileting Modified independent;toilet  transfer;using adaptive equipment;within precautions   OT: Goal 1 Pt will verbalize understanding of pain management, how to progress activity tolerance, fall prevention, walker safety principles, energy conservation strategies to use to enhance independence at home   Interventions   Interventions Quick Adds Self-Care/Home Management   Self-Care/Home Management   Self-Care/Home Mgmt/ADL, Compensatory, Meal Prep Minutes (58176) 19   Symptoms Noted During/After Treatment (Meal Preparation/Planning Training) increased pain   Treatment Detail/Skilled Intervention Upon arrival pt very agreeable to therapy. SO present for most of session. Pt and SO participated in education regarding pain management, how to progress activity tolerance, fall prevention, walker safety principles, energy conservation strategies to use to enhance independence at home, vehicle transfer technique, step in shower transfer technique. Pt doffed/donned socks with SBA and one cue for technique. Pt doffed/donned undergarments with SBA and required one cue to thread RLE first and another to sit for safety w when unthreading underwear from feet. Completed toilet transfer using comfort height toilet with SBA and one cue for technique. During session pt ambulated within room and bathroom with FWW with SBA. Sit to supine with SBA and a cue for how to use gait belt to assist with moving leg and one cue for precaution adherence. Pt verbalized understanding of education and denied questions or concerns regarding discharge home with support system   OT Discharge Planning   OT Plan DC   OT Discharge Recommendation (DC Rec) home with assist;home with home care occupational therapy;Leaving home requires significant assistance;Leaving home requires significant taxing effort   OT Rationale for DC Rec After skilled OT intervention pt is completing self-cares with distant supervision. Anticipate pt will be able to complete self cares with modified independence in  1-2 days. At discharge recommend pt have supervision with self-cares for a day and assist with strenuous IADLs (cooking, cleaning, laundry, etc). Pt reports she can have this level of assist at discharge. Recommend home OT to progress pt's IADL independence as she lives alone and is typically independent in all IADLs.   OT Brief overview of current status SBA toileting, LB dressing, bed mobility, grooming standing   Total Session Time   Timed Code Treatment Minutes 27   Total Session Time (sum of timed and untimed services) 34

## 2023-09-10 NOTE — PLAN OF CARE
Goal Outcome Evaluation:      Plan of Care Reviewed With: patient      Patient A&Ox4. VSS, CMS intact. Dressing is intact. Assist of 1 with gait belt/walker. Tolerated regular diet. Pain control with oxycodone, tylenol,  voiding with post residual check. Place to discharge to TCU.

## 2023-09-11 ENCOUNTER — PATIENT OUTREACH (OUTPATIENT)
Dept: CARE COORDINATION | Facility: CLINIC | Age: 78
End: 2023-09-11
Payer: COMMERCIAL

## 2023-09-11 LAB
PATH REPORT.COMMENTS IMP SPEC: NORMAL
PATH REPORT.COMMENTS IMP SPEC: NORMAL
PATH REPORT.FINAL DX SPEC: NORMAL
PATH REPORT.GROSS SPEC: NORMAL
PATH REPORT.RELEVANT HX SPEC: NORMAL
PHOTO IMAGE: NORMAL

## 2023-09-11 NOTE — PROGRESS NOTES
Clinic Care Coordination Contact  [facility]: Post-Discharge Note  SITUATION                                                      Admission:    Admission Date: 09/07/23   Reason for Admission: S/p right CAL for a femoral neck fracture (DOS: 9/8/23)  Discharge:   Discharge Date: 09/10/23  Discharge Diagnosis: S/p right CAL for a femoral neck fracture (DOS: 9/8/23)    BACKGROUND                                                      Per hospital discharge summary and inpatient provider notes:    Ines Harry is a 77 year old female with a history of Spinal Stenosis, Duodenal Ulcer, RYGB, HTN, Asthma, Nephrolithiasis, Seasonal Allergies, Aortic Stenosis, Aortic Regurgitation, SCC of the Parotid Gland, Depression who presents via EMS from [facility] for right femoral head fracture.     ASSESSMENT           Discharge Assessment  How are you doing now that you are home?: I am doing pretty good.  How are your symptoms? (Red Flag symptoms escalate to triage hotline per guidelines): Improved  Do you feel your condition is stable enough to be safe at home until your provider visit?: Yes  Does the patient have their discharge instructions? : Yes  Does the patient have questions regarding their discharge instructions? : No  Were you started on any new medications or were there changes to any of your previous medications? : Yes  Does the patient have all of their medications?: Yes  Do you have questions regarding any of your medications? : No  Do you have all of your needed medical supplies or equipment (DME)?  (i.e. oxygen tank, CPAP, cane, etc.): Yes  Discharge follow-up appointment scheduled within 14 calendar days? : No  Is patient agreeable to assistance with scheduling? : No    Post-op ([facility] Only)  If the patient had a surgery or procedure, do they have any questions for a nurse?: No           PLAN                                                      Outpatient Plan: 1. Follow-up with Dr. lAmaguer/Sarabjit  MCKAYLA Zuñiga ([facility]) at 2 weeks postoperatively for reevaluation,  wound check/suture or staple removal, and possible x-rays. No need for labs or imaging prior to appointment. Please  call Dr. Almaguer's care coordinator, Milagros, at [phone] to schedule. [facility]emi After Hours  Phone: [phone]  2. Follow-up Cardiology as they are arranging with you for your valve. If you do not hear from them next week, please  call the [facility] clinic.  3. Primary provider - 2 weeks    No future appointments.      For any urgent concerns, please contact our 24 hour nurse triage line: [phone])         ALEKSEY Larson  [phone]  [facility]

## 2023-10-04 NOTE — DISCHARGE SUMMARY
Orthopedic Discharge Summary   Patient: Ines Harry  Admission Date: 9/7/2023  Discharge Date: 9/8/23  Date of Service: 10/4/2023  Attending Provider: No att. providers found  Admission Diagnosis: Closed fracture of neck of right femur, initial encounter (H) [S72.001A]  Hip fracture, right, closed, initial encounter (H) [S72.001A]  Discharge Diagnosis: right femoral neck fracture  Procedures Performed: right total hip replacement  Complications: None apparent   History of Present Illness: see operative report for full HPI  Past Medical History:   History reviewed. No pertinent past medical history.  Patient Active Problem List   Diagnosis    Melena    Hematemesis with nausea    Closed fracture of neck of right femur, initial encounter (H)    Hip fracture, right, closed, initial encounter (H)     Past Surgical History:   Procedure Laterality Date    ARTHROPLASTY HIP Right 9/8/2023    Procedure: Right total hip arthroplasty;  Surgeon: Tarah Almaguer MD;  Location:  OR    ESOPHAGOSCOPY, GASTROSCOPY, DUODENOSCOPY (EGD), COMBINED N/A 5/8/2023    Procedure: Esophagoscopy, gastroscopy, duodenoscopy (EGD), combined with biopsies for h.Pylori;  Surgeon: Emanuel Orourke MD;  Location:  GI     Social History     Socioeconomic History    Marital status:      Spouse name: Not on file    Number of children: Not on file    Years of education: Not on file    Highest education level: Not on file   Occupational History    Not on file   Tobacco Use    Smoking status: Not on file    Smokeless tobacco: Not on file   Substance and Sexual Activity    Alcohol use: Not on file    Drug use: Not on file    Sexual activity: Not on file   Other Topics Concern    Not on file   Social History Narrative    Not on file     Social Determinants of Health     Financial Resource Strain: Not on file   Food Insecurity: Not on file   Transportation Needs: Not on file   Physical Activity: Not on file   Stress: Not on file   Social  Connections: Not on file   Interpersonal Safety: Not on file   Housing Stability: Not on file     Medications on admission:   No medications prior to admission.     Allergies:    Allergies   Allergen Reactions    Cats Other (See Comments)     Sneezing, runny nose    Pollen Extract Other (See Comments)     Sneezing, runny nose       Hospital Course: Patient was brought to the OR on where she underwent the above named procedure. Postoperatively she did very well with no apparent complications, and she had an uneventful hospital stay. Antibiotics were given for 24 hours after surgery. She was given Aspirin twice daily for DVT prophylaxis and her pain was well controlled with oral meds, then transitioned to oral pain medications during her hospital stay. She progressed well with physical therapy and discharge to home was recommended.   Consultations Obtained During Hospitalization:  2. Physical Therapy for gait training, mobilization, range of motion and strengthening exercises  3. Occupational Therapy for activities of daily living.  4. Social Work for disposition planning.  Principal Problem:    Closed fracture of neck of right femur, initial encounter (H)  Active Problems:    Hip fracture, right, closed, initial encounter (H)    Discharge Disposition: patient improving and discharging home  Discharge Diet: resume normal pre op diet  Discharge Medications:   Discharge Medication List as of 9/10/2023  1:51 PM        START taking these medications    Details   acetaminophen (TYLENOL) 325 MG tablet Take 3 tablets (975 mg) by mouth every 8 hours as needed for mild pain, Disp-60 tablet, R-0, E-Prescribe      enoxaparin ANTICOAGULANT (LOVENOX) 30 MG/0.3ML syringe Inject 0.3 mLs (30 mg) Subcutaneous every 24 hours for 42 days, Disp-12.6 mL, R-0, E-Prescribe      hydrOXYzine (ATARAX) 10 MG tablet Take 1 tablet (10 mg) by mouth every 6 hours as needed for other (adjuvant pain), Disp-20 tablet, R-0, E-Prescribe      oxyCODONE  (ROXICODONE) 5 MG tablet Take 1 tablet (5 mg) by mouth every 4 hours as needed for severe pain, Disp-25 tablet, R-0, Local Print      polyethylene glycol (MIRALAX) 17 GM/Dose powder Take 17 g by mouth daily, Disp-510 g, R-0, E-Prescribe      senna-docusate (SENOKOT-S/PERICOLACE) 8.6-50 MG tablet Take 1 tablet by mouth 2 times daily as needed for constipation, Disp-21 tablet, R-0, E-Prescribe      Vitamin D3 (CHOLECALCIFEROL) 25 mcg (1000 units) tablet Take 2 tablets (50 mcg) by mouth daily for 30 days, Disp-60 tablet, R-0, E-Prescribe           CONTINUE these medications which have NOT CHANGED    Details   fexofenadine (ALLEGRA) 180 MG tablet Take 180 mg by mouth daily, Historical      lisinopril (ZESTRIL) 10 MG tablet Take 10 mg by mouth daily, Historical      omeprazole (PRILOSEC) 20 MG DR capsule Take 20 mg by mouth daily, Historical      sertraline (ZOLOFT) 100 MG tablet Take 150 mg by mouth daily, Historical      traZODone (DESYREL) 50 MG tablet Take 50 mg by mouth At Bedtime, Historical           Code Status:   X-rays needed at the follow up visit:   Juan Jose Zuñiga PA-C  10/4/2023 1:55 PM   TC  [phone]

## 2023-10-09 ENCOUNTER — DOCUMENTATION ONLY (OUTPATIENT)
Dept: OUTPATIENT PROCEDURES | Facility: CLINIC | Age: 78
End: 2023-10-09
Payer: COMMERCIAL

## 2024-01-16 ENCOUNTER — TRANSCRIBE ORDERS (OUTPATIENT)
Dept: OTHER | Age: 79
End: 2024-01-16

## 2024-01-16 DIAGNOSIS — Z95.2 S/P TAVR (TRANSCATHETER AORTIC VALVE REPLACEMENT): Primary | ICD-10-CM

## 2024-01-30 ENCOUNTER — TRANSCRIBE ORDERS (OUTPATIENT)
Dept: OTHER | Age: 79
End: 2024-01-30

## 2024-02-06 ENCOUNTER — HOSPITAL ENCOUNTER (OUTPATIENT)
Dept: CARDIAC REHAB | Facility: CLINIC | Age: 79
Discharge: HOME | End: 2024-02-06
Attending: INTERNAL MEDICINE
Payer: COMMERCIAL

## 2024-02-06 PROCEDURE — 93798 PHYS/QHP OP CAR RHAB W/ECG: CPT

## 2024-02-06 PROCEDURE — 93797 PHYS/QHP OP CAR RHAB WO ECG: CPT | Mod: XU

## 2024-02-12 ENCOUNTER — HOSPITAL ENCOUNTER (OUTPATIENT)
Dept: CARDIAC REHAB | Facility: CLINIC | Age: 79
Discharge: HOME | End: 2024-02-12
Attending: INTERNAL MEDICINE
Payer: COMMERCIAL

## 2024-02-12 PROCEDURE — 93798 PHYS/QHP OP CAR RHAB W/ECG: CPT

## 2024-02-19 ENCOUNTER — HOSPITAL ENCOUNTER (OUTPATIENT)
Dept: CARDIAC REHAB | Facility: CLINIC | Age: 79
Discharge: HOME | End: 2024-02-19
Attending: INTERNAL MEDICINE
Payer: COMMERCIAL

## 2024-02-19 PROCEDURE — 93798 PHYS/QHP OP CAR RHAB W/ECG: CPT

## 2024-03-08 ENCOUNTER — HOSPITAL ENCOUNTER (OUTPATIENT)
Dept: CARDIAC REHAB | Facility: CLINIC | Age: 79
Discharge: HOME | End: 2024-03-08
Attending: INTERNAL MEDICINE
Payer: COMMERCIAL

## 2024-03-08 PROCEDURE — 93798 PHYS/QHP OP CAR RHAB W/ECG: CPT | Performed by: REHABILITATION PRACTITIONER

## 2024-03-13 ENCOUNTER — HOSPITAL ENCOUNTER (OUTPATIENT)
Dept: CARDIAC REHAB | Facility: CLINIC | Age: 79
Discharge: HOME | End: 2024-03-13
Attending: INTERNAL MEDICINE
Payer: COMMERCIAL

## 2024-03-13 PROCEDURE — 93798 PHYS/QHP OP CAR RHAB W/ECG: CPT

## 2024-03-15 ENCOUNTER — HOSPITAL ENCOUNTER (OUTPATIENT)
Dept: CARDIAC REHAB | Facility: CLINIC | Age: 79
Discharge: HOME | End: 2024-03-15
Attending: INTERNAL MEDICINE
Payer: COMMERCIAL

## 2024-03-15 PROCEDURE — 93798 PHYS/QHP OP CAR RHAB W/ECG: CPT

## 2024-03-22 ENCOUNTER — HOSPITAL ENCOUNTER (OUTPATIENT)
Dept: CARDIAC REHAB | Facility: CLINIC | Age: 79
Discharge: HOME | End: 2024-03-22
Attending: INTERNAL MEDICINE
Payer: COMMERCIAL

## 2024-03-22 PROCEDURE — 93798 PHYS/QHP OP CAR RHAB W/ECG: CPT | Performed by: OCCUPATIONAL THERAPIST

## 2024-04-03 ENCOUNTER — HOSPITAL ENCOUNTER (OUTPATIENT)
Dept: CARDIAC REHAB | Facility: CLINIC | Age: 79
Discharge: HOME | End: 2024-04-03
Attending: INTERNAL MEDICINE
Payer: COMMERCIAL

## 2024-04-03 PROCEDURE — 93798 PHYS/QHP OP CAR RHAB W/ECG: CPT

## 2024-04-05 ENCOUNTER — HOSPITAL ENCOUNTER (OUTPATIENT)
Dept: CARDIAC REHAB | Facility: CLINIC | Age: 79
Discharge: HOME | End: 2024-04-05
Attending: INTERNAL MEDICINE
Payer: COMMERCIAL

## 2024-04-05 PROCEDURE — 93798 PHYS/QHP OP CAR RHAB W/ECG: CPT

## 2024-04-10 ENCOUNTER — HOSPITAL ENCOUNTER (OUTPATIENT)
Dept: CARDIAC REHAB | Facility: CLINIC | Age: 79
Discharge: HOME | End: 2024-04-10
Attending: INTERNAL MEDICINE
Payer: COMMERCIAL

## 2024-04-10 PROCEDURE — 93798 PHYS/QHP OP CAR RHAB W/ECG: CPT

## 2024-04-12 ENCOUNTER — HOSPITAL ENCOUNTER (OUTPATIENT)
Dept: CARDIAC REHAB | Facility: CLINIC | Age: 79
Discharge: HOME | End: 2024-04-12
Attending: INTERNAL MEDICINE
Payer: COMMERCIAL

## 2024-04-12 PROCEDURE — 93798 PHYS/QHP OP CAR RHAB W/ECG: CPT

## 2024-04-17 ENCOUNTER — HOSPITAL ENCOUNTER (OUTPATIENT)
Dept: CARDIAC REHAB | Facility: CLINIC | Age: 79
Discharge: HOME | End: 2024-04-17
Attending: INTERNAL MEDICINE
Payer: COMMERCIAL

## 2024-04-17 PROCEDURE — 93797 PHYS/QHP OP CAR RHAB WO ECG: CPT | Mod: 59

## 2024-04-17 PROCEDURE — 93798 PHYS/QHP OP CAR RHAB W/ECG: CPT

## 2024-04-28 ENCOUNTER — HOSPITAL ENCOUNTER (EMERGENCY)
Facility: HOSPITAL | Age: 79
Discharge: HOME | End: 2024-04-28
Attending: EMERGENCY MEDICINE | Admitting: EMERGENCY MEDICINE
Payer: COMMERCIAL

## 2024-04-28 ENCOUNTER — APPOINTMENT (OUTPATIENT)
Dept: MRI IMAGING | Facility: HOSPITAL | Age: 79
End: 2024-04-28
Attending: EMERGENCY MEDICINE
Payer: COMMERCIAL

## 2024-04-28 VITALS
BODY MASS INDEX: 19.67 KG/M2 | DIASTOLIC BLOOD PRESSURE: 82 MMHG | RESPIRATION RATE: 16 BRPM | HEART RATE: 68 BPM | WEIGHT: 120 LBS | OXYGEN SATURATION: 98 % | TEMPERATURE: 97.6 F | SYSTOLIC BLOOD PRESSURE: 169 MMHG

## 2024-04-28 DIAGNOSIS — I67.1 CEREBRAL ANEURYSM: ICD-10-CM

## 2024-04-28 DIAGNOSIS — R42 VERTIGO: ICD-10-CM

## 2024-04-28 PROBLEM — Z96.641 HISTORY OF RIGHT HIP REPLACEMENT: Status: ACTIVE | Noted: 2023-09-08

## 2024-04-28 PROBLEM — J30.2 SEASONAL ALLERGIES: Status: ACTIVE | Noted: 2021-05-13

## 2024-04-28 PROBLEM — J30.81 CAT ALLERGIES: Status: ACTIVE | Noted: 2021-05-13

## 2024-04-28 PROBLEM — S72.001A CLOSED FRACTURE OF NECK OF RIGHT FEMUR (H): Status: ACTIVE | Noted: 2023-09-07

## 2024-04-28 PROBLEM — I35.0 SEVERE AORTIC STENOSIS: Status: ACTIVE | Noted: 2020-12-29

## 2024-04-28 PROBLEM — I44.2 IDIOVENTRICULAR RHYTHM (H): Status: ACTIVE | Noted: 2024-01-10

## 2024-04-28 PROBLEM — J45.20 MILD INTERMITTENT ASTHMA WITHOUT COMPLICATION: Chronic | Status: ACTIVE | Noted: 2024-04-28

## 2024-04-28 PROBLEM — I25.118 CORONARY ARTERY DISEASE OF NATIVE ARTERY OF NATIVE HEART WITH STABLE ANGINA PECTORIS (H): Status: ACTIVE | Noted: 2023-11-29

## 2024-04-28 PROBLEM — Z95.2 S/P TAVR (TRANSCATHETER AORTIC VALVE REPLACEMENT): Status: ACTIVE | Noted: 2024-01-09

## 2024-04-28 LAB
ANION GAP SERPL CALCULATED.3IONS-SCNC: 8 MMOL/L (ref 7–15)
APTT PPP: 29 SECONDS (ref 22–38)
BUN SERPL-MCNC: 13.2 MG/DL (ref 8–23)
CALCIUM SERPL-MCNC: 9.3 MG/DL (ref 8.8–10.2)
CHLORIDE SERPL-SCNC: 110 MMOL/L (ref 98–107)
CREAT SERPL-MCNC: 0.81 MG/DL (ref 0.51–0.95)
DEPRECATED HCO3 PLAS-SCNC: 25 MMOL/L (ref 22–29)
EGFRCR SERPLBLD CKD-EPI 2021: 74 ML/MIN/1.73M2
ERYTHROCYTE [DISTWIDTH] IN BLOOD BY AUTOMATED COUNT: 12.9 % (ref 10–15)
GLUCOSE BLDC GLUCOMTR-MCNC: 90 MG/DL (ref 70–99)
GLUCOSE SERPL-MCNC: 102 MG/DL (ref 70–99)
HCT VFR BLD AUTO: 39.7 % (ref 35–47)
HGB BLD-MCNC: 12.7 G/DL (ref 11.7–15.7)
INR PPP: 0.99 (ref 0.85–1.15)
MCH RBC QN AUTO: 32.3 PG (ref 26.5–33)
MCHC RBC AUTO-ENTMCNC: 32 G/DL (ref 31.5–36.5)
MCV RBC AUTO: 101 FL (ref 78–100)
PLATELET # BLD AUTO: 183 10E3/UL (ref 150–450)
POTASSIUM SERPL-SCNC: 3.8 MMOL/L (ref 3.4–5.3)
RBC # BLD AUTO: 3.93 10E6/UL (ref 3.8–5.2)
SODIUM SERPL-SCNC: 143 MMOL/L (ref 135–145)
TROPONIN T SERPL HS-MCNC: 9 NG/L
WBC # BLD AUTO: 6.2 10E3/UL (ref 4–11)

## 2024-04-28 PROCEDURE — 250N000013 HC RX MED GY IP 250 OP 250 PS 637: Performed by: EMERGENCY MEDICINE

## 2024-04-28 PROCEDURE — 36415 COLL VENOUS BLD VENIPUNCTURE: CPT | Performed by: EMERGENCY MEDICINE

## 2024-04-28 PROCEDURE — 70553 MRI BRAIN STEM W/O & W/DYE: CPT

## 2024-04-28 PROCEDURE — 99285 EMERGENCY DEPT VISIT HI MDM: CPT | Mod: 25

## 2024-04-28 PROCEDURE — 85730 THROMBOPLASTIN TIME PARTIAL: CPT | Performed by: EMERGENCY MEDICINE

## 2024-04-28 PROCEDURE — 82962 GLUCOSE BLOOD TEST: CPT

## 2024-04-28 PROCEDURE — 80048 BASIC METABOLIC PNL TOTAL CA: CPT | Performed by: EMERGENCY MEDICINE

## 2024-04-28 PROCEDURE — 85027 COMPLETE CBC AUTOMATED: CPT | Performed by: EMERGENCY MEDICINE

## 2024-04-28 PROCEDURE — 70544 MR ANGIOGRAPHY HEAD W/O DYE: CPT | Mod: XU

## 2024-04-28 PROCEDURE — 85610 PROTHROMBIN TIME: CPT | Performed by: EMERGENCY MEDICINE

## 2024-04-28 PROCEDURE — A9585 GADOBUTROL INJECTION: HCPCS | Performed by: EMERGENCY MEDICINE

## 2024-04-28 PROCEDURE — 93005 ELECTROCARDIOGRAM TRACING: CPT | Performed by: EMERGENCY MEDICINE

## 2024-04-28 PROCEDURE — 70549 MR ANGIOGRAPH NECK W/O&W/DYE: CPT

## 2024-04-28 PROCEDURE — 84484 ASSAY OF TROPONIN QUANT: CPT | Performed by: EMERGENCY MEDICINE

## 2024-04-28 PROCEDURE — 255N000002 HC RX 255 OP 636: Performed by: EMERGENCY MEDICINE

## 2024-04-28 RX ORDER — MECLIZINE HCL 12.5 MG 12.5 MG/1
25 TABLET ORAL ONCE
Status: COMPLETED | OUTPATIENT
Start: 2024-04-28 | End: 2024-04-28

## 2024-04-28 RX ORDER — CARBOXYMETHYLCELLULOSE SODIUM 5 MG/ML
2 SOLUTION/ DROPS OPHTHALMIC
Status: DISCONTINUED | OUTPATIENT
Start: 2024-04-28 | End: 2024-04-28 | Stop reason: HOSPADM

## 2024-04-28 RX ORDER — MECLIZINE HYDROCHLORIDE 25 MG/1
25 TABLET ORAL EVERY 8 HOURS PRN
Qty: 21 TABLET | Refills: 0 | Status: SHIPPED | OUTPATIENT
Start: 2024-04-28

## 2024-04-28 RX ORDER — GADOBUTROL 604.72 MG/ML
5 INJECTION INTRAVENOUS ONCE
Status: COMPLETED | OUTPATIENT
Start: 2024-04-28 | End: 2024-04-28

## 2024-04-28 RX ADMIN — GADOBUTROL 5 ML: 604.72 INJECTION INTRAVENOUS at 12:24

## 2024-04-28 RX ADMIN — MECLIZINE HYDROCHLORIDE 25 MG: 12.5 TABLET ORAL at 11:22

## 2024-04-28 ASSESSMENT — ACTIVITIES OF DAILY LIVING (ADL)
ADLS_ACUITY_SCORE: 38
ADLS_ACUITY_SCORE: 36
ADLS_ACUITY_SCORE: 38
ADLS_ACUITY_SCORE: 38

## 2024-04-28 ASSESSMENT — COLUMBIA-SUICIDE SEVERITY RATING SCALE - C-SSRS
1. IN THE PAST MONTH, HAVE YOU WISHED YOU WERE DEAD OR WISHED YOU COULD GO TO SLEEP AND NOT WAKE UP?: NO
6. HAVE YOU EVER DONE ANYTHING, STARTED TO DO ANYTHING, OR PREPARED TO DO ANYTHING TO END YOUR LIFE?: NO
2. HAVE YOU ACTUALLY HAD ANY THOUGHTS OF KILLING YOURSELF IN THE PAST MONTH?: NO

## 2024-04-28 NOTE — CONSULTS
"[facility]    Stroke Telephone Note    I was called by Arelis Fletcher on 04/28/24 regarding patient Ines Harry. The patient is a 78 year old female presenting from home after having a sensation of feeling off balance at 630.  She woke at 630 feeling normal.  On ED physician exam there are no overt signs of ataxia or weakness.  Per patient report though, she feels like the earth is moving underneath her.    Vitals  BP: (!) 142/86   Pulse: 77   Resp: 16   Temp: 97.6  F (36.4  C)   Weight: 54.4 kg (120 lb)    Imaging Findings  MRI/MRA pending    Impression  Patient presenting with sensation of imbalance.  This possibly represents either peripheral vertigo versus small cerebellar infarct.  In any case, symptoms are mild and would not necessarily lead toward acute intervention.      Recommendations  MRI/MRA of the head and neck to evaluate for sign of small infarction versus other causes of sensations of vertigo.    My recommendations are based on the information provided over the phone by Ines Harry's in-person providers. They are not intended to replace the clinical judgment of her in-person providers. I was not requested to personally see or examine the patient at this time.     Marcelino Briseno MD  Vascular Neurology/Neurocritical Care    To page me or covering stroke neurology team member, click here: [facility]  Choose \"On Call\" tab at top, then select \"NEUROLOGY/ALL SITES\" from middle drop-down box, press Enter, then look for \"stroke\" or \"telestroke\" for your site.         "

## 2024-04-28 NOTE — ED TRIAGE NOTES
"Pt reports feeling dizzy and \"off balanced\" at 0830. Awoke at 0630 feeling fine at that time. Denies N/V. Equal hand grasp no facial droop noted. Provider to triage for neuro eval.   Stroke code not called. MRI checklist completed.        "

## 2024-04-28 NOTE — ED PROVIDER NOTES
Emergency Department Encounter     Evaluation Date & Time:   No admission date for patient encounter.    CHIEF COMPLAINT:  Dizziness      Triage Note:       Impression and Plan       FINAL IMPRESSION:    ICD-10-CM    1. Vertigo  R42       2. Cerebral aneurysm  I67.1 Adult Neurosurgery [facility] Referral          ED COURSE & MEDICAL DECISION MAKING:  10:45 AM I met with the patient, obtained history, performed an initial exam, and discussed options and plan for diagnostics and treatment here in the ED.   10:50 AM I spoke with Dr. Briseno from Stroke Neurology.   1:48 PM I updated the patient on results and we discussed the plan for discharge and the patient is agreeable. Reviewed supportive cares, symptomatic treatment, outpatient follow up, and reasons to return to the Emergency Department. All questions and concerns were addressed. Patient to be discharged by ED RN.      78 year old female, history of severe aortic stenosis s/p TAVR (1/9/2024), CAD, HTN, HLD and traumatic hip fracture, who presents for evaluation of vertigo that started ~2 hours ago associated with unsteadiness on ambulation. She denies associated headache, vision changes, extremity weakness / paresthesias or difficulty with speech. She reports chronic issues with dysphagia. She is on Plavix.     I was called to Triage for a neuro assessment.     Neuro exam is normal.    I spoke with the Stroke Neurologist who agrees with plan to obtain MRI imaging to rule out central etiology.    EKG performed and demonstrated NSR with no ischemic changes. Troponin WNL (9).    Labs otherwise remarkable for no leukocytosis, anemia, significant electrolyte derangements or renal impairment.  Coags WNL.    MRI brain negative for acute intracranial process.    MRA head demonstrated:  1.  No significant stenosis identified.  2.  Small posteriorly directing aneurysm or infundibulum measuring 2 mm arising from the right carotid terminus.   3.  Question a small medially  projecting aneurysm of the distal left cavernous internal carotid artery, just prior to the clinoid segment.     MRA neck with no hemodynamically significant stenosis of neck vessels.    Patient feeling significantly better after meclizine and has been able to ambulate independently and tolerate po. Patient comfortable with discharge to home. She was given a prescription for meclizine. She also was advised to follow-up with her PCP for a recheck this week. I also placed a referral for Neurosurgery (Vascular) regarding the incidental cerebral aneurysms seen on imaging today. Return precautions provided. Patient stable throughout ED course.       At the conclusion of the encounter I discussed the results of all the tests and the disposition. The questions were answered. The patient and family acknowledged understanding and were agreeable with the care plan.      Medical Decision Making    History:  Obtained supplemental history:Supplemental history obtained?: Family Member/Significant Other  Reviewed external records: External records reviewed?: Outpatient Record: [facility] [id number] [facility]    External consultation:  Did you consider but not order tests?: Work up considered but not performed and documented in chart, if applicable  Did you interpret images independently?: Independent interpretation of ECG and images noted in documentation, when applicable.  Consultation discussion with other provider:Did you involve another provider (consultant, MH, pharmacy, etc.)?: I discussed the care with another health care provider, see documentation for details. Stroke Neurology    Complicating factors:  Care impacted by chronic illness:Anticoagulated State, Heart Disease, Hyperlipidemia, and Hypertension  Care significantly affected by social determinants of health:N/A    Disposition considerations: Discharge. I prescribed additional prescription strength medication(s) as charted. I considered admission, but  ultimately discharged patient given reassuring evaluation, clinical improvement and shared decision making conversation.    MEDICATIONS GIVEN IN THE EMERGENCY DEPARTMENT:  Medications   meclizine (ANTIVERT) tablet 25 mg (25 mg Oral $Given 4/28/24 1122)   gadobutrol (GADAVIST) injection 5 mL (5 mLs Intravenous $Given 4/28/24 1228)       NEW PRESCRIPTIONS STARTED AT TODAY'S ED VISIT:  Discharge Medication List as of 4/28/2024  1:58 PM        START taking these medications    Details   meclizine (ANTIVERT) 25 MG tablet Take 1 tablet (25 mg) by mouth every 8 hours as needed for dizziness, Disp-21 tablet, R-0, Local Print             HPI     HPI     Ines Harry is a 78 year old female, history of severe aortic stenosis s/p TAVR (1/9/2024), CAD, HTN, HLD and traumatic hip fracture, who presents to this ED via walk-in for evaluation of dizziness.    The patient awoke feeling normal at 6:30 AM. Then around 8:30 AM (~2 hours ago), she started to have room-spinning dizziness and felt very unsteady when walking. She continues to have these symptoms now. There has been no change in symptoms when she turns her head, walks or changes position.  reports that she is unsteady when she walks.     She denies associated headache, vision changes, extremity weakness / paresthesias or difficulty with speech. She reports chronic issues with dysphagia.     She is on Plavix.     She has otherwise been in her usual state of health and denies chest pain, shortness of breath, abdominal pain, nausea / vomiting, diarrhea, cough, fever or other concerns.    Chart Review:   4/8/2024: The patient was seen by [facility] for routine follow up on her TAVR procedure 3 weeks ago. The current plan was to continue Plavix until 7/9/2024, continue baby Aspirin, continue physical therapy, and see Dr. Ordoñez (neurology) if her dizziness persisted. She was instructed to follow up in 1 year with an echocardiogram done.      REVIEW OF SYSTEMS:  All other systems reviewed and are negative.      Medical History     History reviewed. No pertinent past medical history.    Past Surgical History:   Procedure Laterality Date    ARTHROPLASTY HIP Right 9/8/2023    Procedure: Right total hip arthroplasty;  Surgeon: Tarah Almaguer MD;  Location:  OR    ESOPHAGOSCOPY, GASTROSCOPY, DUODENOSCOPY (EGD), COMBINED N/A 5/8/2023    Procedure: Esophagoscopy, gastroscopy, duodenoscopy (EGD), combined with biopsies for h.Pylori;  Surgeon: Emanuel Orourke MD;  Location:  GI       History reviewed. No pertinent family history.         meclizine (ANTIVERT) 25 MG tablet  acetaminophen (TYLENOL) 325 MG tablet  fexofenadine (ALLEGRA) 180 MG tablet  hydrOXYzine (ATARAX) 10 MG tablet  lisinopril (ZESTRIL) 10 MG tablet  omeprazole (PRILOSEC) 20 MG DR capsule  oxyCODONE (ROXICODONE) 5 MG tablet  polyethylene glycol (MIRALAX) 17 GM/Dose powder  senna-docusate (SENOKOT-S/PERICOLACE) 8.6-50 MG tablet  sertraline (ZOLOFT) 100 MG tablet  traZODone (DESYREL) 50 MG tablet        Physical Exam     First Vitals:  BP (!) 169/82   Pulse 68   Temp 97.6  F (36.4  C)   Resp 16   Wt 54.4 kg (120 lb)   SpO2 98%   BMI 19.67 kg/m      PHYSICAL EXAM:   Physical Exam    GENERAL: Awake, alert.  In no acute distress.   HEENT: Normocephalic, atraumatic. Pupils equal, round and reactive. Conjunctiva normal. EOMI without nystagmus. No visual field deficits. Normal posterior oropharynx. Tongue is midline.   NECK: No stridor.  PULMONARY: Symmetrical breath sounds without distress.  Lungs clear to auscultation bilaterally without wheezes, rhonchi or rales.  CARDIO: Regular rate and rhythm.  No significant murmur, rub or gallop.    ABDOMINAL: Abdomen soft, non-distended and non-tender to palpation.   EXTREMITIES: No lower extremity swelling or edema.      NEURO: Alert and oriented to person, place and time.  Cranial nerves II-XII intact.  Strength 5/5 BL upper and lower  extremities with sensation to light touch grossly intact. No pronator drift. Normal finger-nose-finger testing without dysmetria or overshooting. No ataxia on heel-shin testing.   PSYCH: Normal mood and affect.      Results     LAB:  All pertinent labs reviewed and interpreted  Labs Ordered and Resulted from Time of ED Arrival to Time of ED Departure   CBC WITH PLATELETS - Abnormal       Result Value    WBC Count 6.2      RBC Count 3.93      Hemoglobin 12.7      Hematocrit 39.7       (*)     MCH 32.3      MCHC 32.0      RDW 12.9      Platelet Count 183     BASIC METABOLIC PANEL - Abnormal    Sodium 143      Potassium 3.8      Chloride 110 (*)     Carbon Dioxide (CO2) 25      Anion Gap 8      Urea Nitrogen 13.2      Creatinine 0.81      GFR Estimate 74      Calcium 9.3      Glucose 102 (*)    GLUCOSE BY METER - Normal    GLUCOSE BY METER POCT 90     INR - Normal    INR 0.99     PARTIAL THROMBOPLASTIN TIME - Normal    aPTT 29     TROPONIN T, HIGH SENSITIVITY - Normal    Troponin T, High Sensitivity 9         RADIOLOGY:  MRA Neck (Carotids) wo & w Contrast   Final Result   IMPRESSION:   HEAD MRI:    1.  No acute intracranial process.   2.  Generalized brain atrophy and presumed microvascular ischemic changes as detailed above.      HEAD MRA:    1.  No significant stenosis identified.   2.  Small posteriorly directing aneurysm or infundibulum measuring 2 mm arising from the right carotid terminus.    3.  Question a small medially projecting aneurysm of the distal left cavernous internal carotid artery, just prior to the clinoid segment.       NECK MRA:   1.  No hemodynamically significant stenosis within the vessels of the neck.      MRA Brain ([place] [facility] [place]) wo Contrast   Final Result   IMPRESSION:   HEAD MRI:    1.  No acute intracranial process.   2.  Generalized brain atrophy and presumed microvascular ischemic changes as detailed above.      HEAD MRA:    1.  No significant stenosis identified.   2.  Small  posteriorly directing aneurysm or infundibulum measuring 2 mm arising from the right carotid terminus.    3.  Question a small medially projecting aneurysm of the distal left cavernous internal carotid artery, just prior to the clinoid segment.       NECK MRA:   1.  No hemodynamically significant stenosis within the vessels of the neck.      MR Brain w/o & w Contrast   Final Result   IMPRESSION:   HEAD MRI:    1.  No acute intracranial process.   2.  Generalized brain atrophy and presumed microvascular ischemic changes as detailed above.      HEAD MRA:    1.  No significant stenosis identified.   2.  Small posteriorly directing aneurysm or infundibulum measuring 2 mm arising from the right carotid terminus.    3.  Question a small medially projecting aneurysm of the distal left cavernous internal carotid artery, just prior to the clinoid segment.       NECK MRA:   1.  No hemodynamically significant stenosis within the vessels of the neck.        EC2024, 11:29; NSR with rate of 68 bpm; normal intervals; normal conduction; no ST-T wave changes consistent with ACS or pericarditis; no previous EKG available for comparison    EKG independently reviewed and interpreted by Arelis Fletcher MD        I, Ian Sánchez, am serving as a scribe to document services personally performed by Arelis Fletcher MD based on my observation and the provider's statements to me. IArelis MD attest that Ian Sánchez is acting in a scribe capacity, has observed my performance of the services and has documented them in accordance with my direction.    Arelis Fletcher MD  Emergency Medicine  [facility] EMERGENCY DEPARTMENT           Arelis Fletcher MD  24 [id number]

## 2024-04-28 NOTE — DISCHARGE INSTRUCTIONS
Please follow-up with your Primary Care Provider this upcoming week for a recheck; call to arrange appointment.    Please follow-up with the Neurosurgery (Vascular) Clinic at the next earliest available appointment regarding the aneurysms incidentally seen on your imaging today.    Return to the ER for worsening symptoms, worsening dizziness, sudden onset or severe headache, focal neurologic deficit (for example, facial droop or right arm weakness), persistent nausea / vomiting, fever or other concerns.

## 2024-04-29 LAB
ATRIAL RATE - MUSE: 68 BPM
DIASTOLIC BLOOD PRESSURE - MUSE: NORMAL MMHG
INTERPRETATION ECG - MUSE: NORMAL
P AXIS - MUSE: 17 DEGREES
PR INTERVAL - MUSE: 170 MS
QRS DURATION - MUSE: 90 MS
QT - MUSE: 440 MS
QTC - MUSE: 467 MS
R AXIS - MUSE: -2 DEGREES
SYSTOLIC BLOOD PRESSURE - MUSE: NORMAL MMHG
T AXIS - MUSE: 42 DEGREES
VENTRICULAR RATE- MUSE: 68 BPM

## 2024-04-29 NOTE — TELEPHONE ENCOUNTER
RECORDS RECEIVED FROM: Care Everywhere   REASON FOR VISIT: Cerebral aneurysm [I67.1]incidental cerebral aneurysm x 2 on MRA imaging for vertigo   PROVIDER: Viviane Heaton APRN CNP   DATE OF APPT: 5/29/24 @ 3:00 pm    NOTES (FOR ALL VISITS) STATUS DETAILS   OFFICE NOTE from referring provider Internal ED Referral    OFFICE NOTE from other specialist Care Everywhere 4/10/24 Monie Ordoñez MD  @[facility]     DISCHARGE REPORT from the ER Internal 4/28/24 Arelis Fletcher MD @[facility]     MEDICATION LIST Internal    IMAGING  (FOR ALL VISITS)     MRI (HEAD, NECK, SPINE) Internal [facility]  4/28/24 MRA Neck (Carotid)  4/28/24 MRA Brain (COW)  4/28/24 MR Brain

## 2024-05-29 ENCOUNTER — PRE VISIT (OUTPATIENT)
Dept: NEUROSURGERY | Facility: CLINIC | Age: 79
End: 2024-05-29

## 2024-05-29 ENCOUNTER — VIRTUAL VISIT (OUTPATIENT)
Dept: NEUROSURGERY | Facility: CLINIC | Age: 79
End: 2024-05-29
Attending: EMERGENCY MEDICINE
Payer: COMMERCIAL

## 2024-05-29 DIAGNOSIS — I67.1 CEREBRAL ANEURYSM: ICD-10-CM

## 2024-05-29 PROCEDURE — 99202 OFFICE O/P NEW SF 15 MIN: CPT | Mod: 95 | Performed by: NURSE PRACTITIONER

## 2024-05-29 NOTE — PROGRESS NOTES
Virtual Visit Details    Type of service:  Video Visit   Video Start Time:  3.10  Video End Time:3.25    Originating Location (pt. Location): Home    Distant Location (provider location):  Off-site  Platform used for Video Visit: [facility]  Department of Neurosurgery      Name: Ines Harry  MRN: [id number]  Age: 78 year old  : 1945  Referring provider: Arelis Fletcher  2024      Chief Complaint:   Cerebral aneurysm, unruptured  New patient    History of Present Illness:   Ines Harry is a 78 year old female with a history of severe aortic stenosis, S/p TAVR in 2024 who is seen today for recent diagnosis of brain aneurysm.    In 2024, patient underwent brain imaging for vertigo and balance issues.  This showed an incidental finding of small, 2 mm aneurysm versus infundibulum from the right carotid terminus and questionable aneurysm of the left cavernous internal carotid artery.    Today I had a video visit with the patient.  She denies any known family history of brain aneurysms.  She has hypertension and is on lisinopril.  She is a non-smoker.    Review of Systems:   Pertinent items are noted in HPI or as in patient entered ROS below, remainder of complete ROS is negative.        No data to display                 Physical Exam:   There were no vitals taken for this visit.   General: No acute distress.    Neuro: The patient is fully oriented. Speech is normal. Psych: Normal mood and affect. Behavior is normal.      Imagin2024 MRA brain:  HEAD MRA:   1.  No significant stenosis identified.  2.  Small posteriorly directing aneurysm or infundibulum measuring 2 mm arising from the right carotid terminus.   3.  Question a small medially projecting aneurysm of the distal left cavernous internal carotid artery, just prior to the clinoid segment.      NECK MRA:  1.  No hemodynamically significant stenosis within the vessels of the  neck.    Assessment:  Cerebral aneurysm, nonruptured  New patient    Plan:  Today we discussed her recent brain imaging findings in detail.  These aneurysms are very small with very low risk of rupture.  Will review imaging with Dr. Brar and will contact the patient with follow-up recommendation.       I spent 15 minutes on patient care activities related to this encounter on the date of service, including time spent reviewing the chart, obtaining history and examination and in counseling the patient, and in documentation in the electronic medical record.      Viviane LÓPEZ CNP  Department of Neurosurgery

## 2024-05-29 NOTE — LETTER
2024       RE: Ines Harry  [address] [place] [address]     Dear Colleague,    Thank you for referring your patient, Ines Harry, to the [facility] [place] at [facility]. Please see a copy of my visit note below.    [facility]  Department of Neurosurgery      Name: Ines Harry  MRN: [id number]  Age: 78 year old  : 1945  Referring provider: Arelis Fletcher  2024      Chief Complaint:   Cerebral aneurysm, unruptured  New patient    History of Present Illness:   Ines Harry is a 78 year old female with a history of severe aortic stenosis, S/p TAVR in 2024 who is seen today for recent diagnosis of brain aneurysm.    In 2024, patient underwent brain imaging for vertigo and balance issues.  This showed an incidental finding of small, 2 mm aneurysm versus infundibulum from the right carotid terminus and questionable aneurysm of the left cavernous internal carotid artery.    Today I had a video visit with the patient.  She denies any known family history of brain aneurysms.  She has hypertension and is on lisinopril.  She is a non-smoker.    Review of Systems:   Pertinent items are noted in HPI or as in patient entered ROS below, remainder of complete ROS is negative.        No data to display                 Physical Exam:   There were no vitals taken for this visit.   General: No acute distress.    Neuro: The patient is fully oriented. Speech is normal. Psych: Normal mood and affect. Behavior is normal.      Imagin2024 MRA brain:  HEAD MRA:   1.  No significant stenosis identified.  2.  Small posteriorly directing aneurysm or infundibulum measuring 2 mm arising from the right carotid terminus.   3.  Question a small medially projecting aneurysm of the distal left cavernous internal carotid artery, just prior to the clinoid segment.      NECK MRA:  1.  No  hemodynamically significant stenosis within the vessels of the neck.    Assessment:  Cerebral aneurysm, nonruptured  New patient    Plan:  Today we discussed her recent brain imaging findings in detail.  These aneurysms are very small with very low risk of rupture.  Will review imaging with Dr. Brar and will contact the patient with follow-up recommendation.       I spent 15 minutes on patient care activities related to this encounter on the date of service, including time spent reviewing the chart, obtaining history and examination and in counseling the patient, and in documentation in the electronic medical record.        Again, thank you for allowing me to participate in the care of your patient.      Sincerely,    RENE Pagan CNP

## 2024-05-29 NOTE — NURSING NOTE
Is the patient currently in the state of [place]? YES    Visit mode:VIDEO    If the visit is dropped, the patient can be reconnected by: TELEPHONE VISIT: Phone number:   Telephone Information:   Mobile [phone]       Will anyone else be joining the visit? NO  (If patient encounters technical issues they should call [phone] :[phone])    How would you like to obtain your AVS? MyChart    Are changes needed to the allergy or medication list? Pt stated no med changes    Are refills needed on medications prescribed by this physician? NO    Reason for visit: Video Visit (Cerebral aneurysm )    Cait BARLOW

## 2024-10-06 ENCOUNTER — HEALTH MAINTENANCE LETTER (OUTPATIENT)
Age: 79
End: 2024-10-06

## 2025-05-05 ENCOUNTER — LAB REQUISITION (OUTPATIENT)
Dept: LAB | Facility: CLINIC | Age: 80
End: 2025-05-05
Payer: COMMERCIAL

## 2025-05-05 DIAGNOSIS — Z11.1 ENCOUNTER FOR SCREENING FOR RESPIRATORY TUBERCULOSIS: ICD-10-CM

## 2025-05-07 LAB
GAMMA INTERFERON BACKGROUND BLD IA-ACNC: 0.05 IU/ML
M TB IFN-G BLD-IMP: NEGATIVE
M TB IFN-G CD4+ BCKGRND COR BLD-ACNC: 1.03 IU/ML
MITOGEN IGNF BCKGRD COR BLD-ACNC: 0.01 IU/ML
MITOGEN IGNF BCKGRD COR BLD-ACNC: 0.01 IU/ML
QUANTIFERON MITOGEN: 1.08 IU/ML
QUANTIFERON NIL TUBE: 0.05 IU/ML
QUANTIFERON TB1 TUBE: 0.06 IU/ML
QUANTIFERON TB2 TUBE: 0.06

## 2025-05-08 ENCOUNTER — LAB REQUISITION (OUTPATIENT)
Dept: LAB | Facility: CLINIC | Age: 80
End: 2025-05-08
Payer: COMMERCIAL

## 2025-05-08 DIAGNOSIS — I10 ESSENTIAL (PRIMARY) HYPERTENSION: ICD-10-CM

## 2025-05-08 DIAGNOSIS — D64.9 ANEMIA, UNSPECIFIED: ICD-10-CM

## 2025-05-08 DIAGNOSIS — R41.3 OTHER AMNESIA: ICD-10-CM

## 2025-05-12 LAB
ANION GAP SERPL CALCULATED.3IONS-SCNC: 10 MMOL/L (ref 7–15)
BUN SERPL-MCNC: 13.4 MG/DL (ref 8–23)
CALCIUM SERPL-MCNC: 8.9 MG/DL (ref 8.8–10.4)
CHLORIDE SERPL-SCNC: 106 MMOL/L (ref 98–107)
CREAT SERPL-MCNC: 0.7 MG/DL (ref 0.51–0.95)
EGFRCR SERPLBLD CKD-EPI 2021: 87 ML/MIN/1.73M2
ERYTHROCYTE [DISTWIDTH] IN BLOOD BY AUTOMATED COUNT: 13.1 % (ref 10–15)
GLUCOSE SERPL-MCNC: 94 MG/DL (ref 70–99)
HCO3 SERPL-SCNC: 25 MMOL/L (ref 22–29)
HCT VFR BLD AUTO: 30.2 % (ref 35–47)
HGB BLD-MCNC: 9.2 G/DL (ref 11.7–15.7)
MCH RBC QN AUTO: 31.1 PG (ref 26.5–33)
MCHC RBC AUTO-ENTMCNC: 30.5 G/DL (ref 31.5–36.5)
MCV RBC AUTO: 102 FL (ref 78–100)
PLATELET # BLD AUTO: 276 10E3/UL (ref 150–450)
POTASSIUM SERPL-SCNC: 4 MMOL/L (ref 3.4–5.3)
RBC # BLD AUTO: 2.96 10E6/UL (ref 3.8–5.2)
SODIUM SERPL-SCNC: 141 MMOL/L (ref 135–145)
TSH SERPL DL<=0.005 MIU/L-ACNC: 9.22 UIU/ML (ref 0.3–4.2)
WBC # BLD AUTO: 6.6 10E3/UL (ref 4–11)

## 2025-05-12 PROCEDURE — 84443 ASSAY THYROID STIM HORMONE: CPT | Mod: ORL | Performed by: FAMILY MEDICINE

## 2025-05-12 PROCEDURE — 85027 COMPLETE CBC AUTOMATED: CPT | Mod: ORL | Performed by: FAMILY MEDICINE

## 2025-05-12 PROCEDURE — 80048 BASIC METABOLIC PNL TOTAL CA: CPT | Mod: ORL | Performed by: FAMILY MEDICINE

## 2025-05-12 PROCEDURE — 36415 COLL VENOUS BLD VENIPUNCTURE: CPT | Mod: ORL | Performed by: FAMILY MEDICINE

## 2025-05-12 PROCEDURE — P9604 ONE-WAY ALLOW PRORATED TRIP: HCPCS | Mod: ORL | Performed by: FAMILY MEDICINE

## (undated) DEVICE — DRAPE STERI TOWEL LG 1010

## (undated) DEVICE — PACK TOTAL HIP RIDGES LATEX PO15HIFSG

## (undated) DEVICE — BONE WAX 2.5GM W31G

## (undated) DEVICE — DRSG ABDOMINAL 07 1/2X8" 7197D

## (undated) DEVICE — ESU PENCIL W/SMOKE EVAC CVPLP2000

## (undated) DEVICE — KIT ENDO TURNOVER/PROCEDURE W/CLEAN A SCOPE LINERS 103888

## (undated) DEVICE — LINEN FULL SHEET 5511

## (undated) DEVICE — ENDO FORCEP ENDOJAW BIOPSY 2.8MMX160CM FB-220K

## (undated) DEVICE — SU VICRYL 2-0 CT-1 27" UND J259H

## (undated) DEVICE — SU ETHIBOND 0 CT-1 CR 8X18" CX21D

## (undated) DEVICE — ENDO BITE BLOCK ADULT OLYMPUS LATEX FREE MAJ-1632

## (undated) DEVICE — MANIFOLD NEPTUNE 4 PORT 700-20

## (undated) DEVICE — LINEN HALF SHEET 5512

## (undated) DEVICE — SU DERMABOND PRINEO 22CM CLR222US

## (undated) DEVICE — SU ETHIBOND 2 V-37 4X30" MX69G

## (undated) DEVICE — SU VICRYL 3-0 SH 27" J316H

## (undated) DEVICE — DRAPE IOBAN INCISE 23X17" 6650EZ

## (undated) DEVICE — GLOVE BIOGEL PI SZ 8.0 40880

## (undated) DEVICE — LINEN ORTHO ACL PACK 5447

## (undated) DEVICE — ESU ELEC BLADE 6" COATED E1450-6

## (undated) DEVICE — SOL NACL 0.9% IRRIG 3000ML BAG 2B7477

## (undated) DEVICE — SUCTION TIP YANKAUER W/O VENT K86

## (undated) DEVICE — SU MONOCRYL 3-0 PS-2 27" Y427H

## (undated) DEVICE — LINEN DRAPE 54X72" 5467

## (undated) DEVICE — BAG CLEAR TRASH 1.3M 39X33" P4040C

## (undated) DEVICE — SU VICRYL 0 CT-2 27" J334H

## (undated) DEVICE — BLADE SAW SAGITTAL STRK 25X90X1.27MM HD SYS 6 6125-127-090

## (undated) DEVICE — SOL NACL 0.9% IRRIG 1000ML BOTTLE 2F7124

## (undated) DEVICE — DRSG GAUZE 4X4" TRAY

## (undated) DEVICE — GLOVE BIOGEL PI MICRO INDICATOR UNDERGLOVE SZ 8.0 48980

## (undated) DEVICE — SET HANDPIECE INTERPULSE W/COAXIAL FAN SPRAY TIP 0210118000

## (undated) DEVICE — IMM PILLOW ABDUCT HIP MED M60-025-M

## (undated) DEVICE — DRAPE CONVERTORS U-DRAPE 60X72" 8476

## (undated) DEVICE — SU STRATAFIX PDS PLUS 0 CT-1 18" SXPP1A401

## (undated) RX ORDER — CEFAZOLIN SODIUM/WATER 2 G/20 ML
SYRINGE (ML) INTRAVENOUS
Status: DISPENSED
Start: 2023-09-08

## (undated) RX ORDER — FENTANYL CITRATE 50 UG/ML
INJECTION, SOLUTION INTRAMUSCULAR; INTRAVENOUS
Status: DISPENSED
Start: 2023-09-08

## (undated) RX ORDER — PROPOFOL 10 MG/ML
INJECTION, EMULSION INTRAVENOUS
Status: DISPENSED
Start: 2023-09-08

## (undated) RX ORDER — DEXAMETHASONE SODIUM PHOSPHATE 4 MG/ML
INJECTION, SOLUTION INTRA-ARTICULAR; INTRALESIONAL; INTRAMUSCULAR; INTRAVENOUS; SOFT TISSUE
Status: DISPENSED
Start: 2023-09-08

## (undated) RX ORDER — LIDOCAINE HYDROCHLORIDE 10 MG/ML
INJECTION, SOLUTION EPIDURAL; INFILTRATION; INTRACAUDAL; PERINEURAL
Status: DISPENSED
Start: 2023-09-08

## (undated) RX ORDER — ONDANSETRON 2 MG/ML
INJECTION INTRAMUSCULAR; INTRAVENOUS
Status: DISPENSED
Start: 2023-09-08

## (undated) RX ORDER — TRANEXAMIC ACID 10 MG/ML
INJECTION, SOLUTION INTRAVENOUS
Status: DISPENSED
Start: 2023-09-08

## (undated) RX ORDER — FENTANYL CITRATE 50 UG/ML
INJECTION, SOLUTION INTRAMUSCULAR; INTRAVENOUS
Status: DISPENSED
Start: 2023-05-08